# Patient Record
Sex: FEMALE | Race: BLACK OR AFRICAN AMERICAN | NOT HISPANIC OR LATINO | Employment: PART TIME | URBAN - METROPOLITAN AREA
[De-identification: names, ages, dates, MRNs, and addresses within clinical notes are randomized per-mention and may not be internally consistent; named-entity substitution may affect disease eponyms.]

---

## 2017-07-19 ENCOUNTER — TELEPHONE (OUTPATIENT)
Dept: SLEEP MEDICINE | Facility: CLINIC | Age: 47
End: 2017-07-19

## 2017-07-19 NOTE — TELEPHONE ENCOUNTER
----- Message from Grazyna Lua sent at 7/19/2017  9:09 AM CDT -----  Contact: self/ [phone]  Patient needs to have appointment on a Monday or Tuesday due to work schedule.    Please call and advise.  She stated a message could be left if she does not answer.

## 2018-01-24 ENCOUNTER — HOSPITAL ENCOUNTER (EMERGENCY)
Facility: HOSPITAL | Age: 48
Discharge: HOME | End: 2018-01-24
Attending: EMERGENCY MEDICINE
Payer: MEDICARE

## 2018-01-24 VITALS
RESPIRATION RATE: 20 BRPM | HEART RATE: 86 BPM | SYSTOLIC BLOOD PRESSURE: 143 MMHG | HEIGHT: 60 IN | TEMPERATURE: 98 F | BODY MASS INDEX: 33.38 KG/M2 | WEIGHT: 170 LBS | DIASTOLIC BLOOD PRESSURE: 83 MMHG

## 2018-01-24 DIAGNOSIS — B30.9 VIRAL CONJUNCTIVITIS OF LEFT EYE: Primary | ICD-10-CM

## 2018-01-24 PROCEDURE — 99283 EMERGENCY DEPT VISIT LOW MDM: CPT

## 2018-01-24 PROCEDURE — 25000003 PHARM REV CODE 250: Performed by: EMERGENCY MEDICINE

## 2018-01-24 PROCEDURE — 99283 EMERGENCY DEPT VISIT LOW MDM: CPT | Mod: ,,, | Performed by: EMERGENCY MEDICINE

## 2018-01-24 RX ORDER — POLYMYXIN B SULFATE AND TRIMETHOPRIM 1; 10000 MG/ML; [USP'U]/ML
1 SOLUTION OPHTHALMIC EVERY 4 HOURS
Qty: 2.8 ML | Refills: 0 | Status: SHIPPED | OUTPATIENT
Start: 2018-01-24 | End: 2018-01-31

## 2018-01-24 RX ORDER — PROPARACAINE HYDROCHLORIDE 5 MG/ML
1 SOLUTION/ DROPS OPHTHALMIC
Status: COMPLETED | OUTPATIENT
Start: 2018-01-24 | End: 2018-01-24

## 2018-01-24 RX ADMIN — PROPARACAINE HYDROCHLORIDE 1 DROP: 5 SOLUTION/ DROPS OPHTHALMIC at 07:01

## 2018-01-24 RX ADMIN — FLUORESCEIN SODIUM 1 STRIP: 1 STRIP OPHTHALMIC at 07:01

## 2018-01-24 NOTE — ED NOTES
Patient identifiers for Tresa Garcia checked and correct.  LOC: Patient is awake, alert, and aware of environment with an appropriate affect. Patient is oriented x 3 and speaking appropriately.  APPEARANCE: Patient resting comfortably and in no acute distress. Patient is clean and well groomed, patient's clothing is properly fastened.  SKIN: The skin is warm and dry. Patient has normal skin turgor and moist mucus membrances. Skin is intact; no bruising or breakdown noted.  MUSKULOSKELETAL: Patient is moving all extremities well, no obvious deformities noted. Pulses intact.   RESPIRATORY: Airway is open and patent. Respirations are spontaneous and non-labored with normal effort and rate.  CARDIAC: Patient has a normal rate and rhythm. No peripheral edema noted. Capillary refill < 3 seconds.  ABDOMEN: No distention noted. Bowel sounds active in all 4 quadrants. Soft and non-tender upon palpation.  NEUROLOGICAL: PERRL. Facial expression is symmetrical. Hand grasps are equal bilaterally. Normal sensation in all extremities when touched with finger.  Allergies reported:   Review of patient's allergies indicates:   Allergen Reactions    Fish containing products Hives    Shellfish containing products      Hives (skin)^FISH ONLY

## 2018-01-24 NOTE — ED PROVIDER NOTES
Encounter Date: 1/24/2018       History     Chief Complaint   Patient presents with    Eye Problem     patient reports left eye redness and itching. Pt also reports eye drainage     HPI   Ms. Garcia is a 47-year-old AAF with hypothyroidism and HIV (unknown Cd4 count, but on therapy) who presents to the ED for a 3 day history of an injected left eye. She states that it is pruritic and has had a clear discharge from the left eye. She denies pain with ocular movements or vision changes. She denies contact use. She tried Visine without relief.   Denies chest pain, SOB, nausea, vomiting, constipation, or diarrhea.     Review of patient's allergies indicates:   Allergen Reactions    Fish containing products Hives    Shellfish containing products      Hives (skin)^FISH ONLY     Past Medical History:   Diagnosis Date    Asthma     HIV (human immunodeficiency virus infection)     Thyroid disease      Past Surgical History:   Procedure Laterality Date    PARTIAL HYSTERECTOMY       Family History   Problem Relation Age of Onset    Hypertension Mother     Cancer Mother     Diabetes Mother      Social History   Substance Use Topics    Smoking status: Former Smoker     Packs/day: 1.00     Years: 30.00     Types: Cigarettes     Quit date: 6/11/2010    Smokeless tobacco: Never Used    Alcohol use No     Review of Systems   Constitutional: Negative for fever.   HENT: Negative for sore throat.    Eyes: Positive for discharge, redness and itching. Negative for photophobia, pain and visual disturbance.   Respiratory: Negative for shortness of breath.    Cardiovascular: Negative for chest pain.   Gastrointestinal: Negative for nausea.   Genitourinary: Negative for dysuria.   Musculoskeletal: Negative for back pain.   Skin: Negative for rash.   Neurological: Negative for weakness.   Hematological: Does not bruise/bleed easily.   All other systems reviewed and are negative.      Physical Exam     Initial Vitals [01/24/18  0606]   BP Pulse Resp Temp SpO2   (!) 143/83 86 20 98 °F (36.7 °C) --      MAP       103         Physical Exam    Vitals reviewed.  Constitutional: She appears well-developed and well-nourished. She is not diaphoretic. No distress.   HENT:   Head: Normocephalic and atraumatic.   Mouth/Throat: No oropharyngeal exudate.   Eyes: EOM are normal. Pupils are equal, round, and reactive to light. Lids are everted and swept, no foreign bodies found. Right eye exhibits no chemosis, no discharge and no exudate. No foreign body present in the right eye. Left eye exhibits chemosis and discharge (clear). Left eye exhibits no exudate. No foreign body present in the left eye. Right conjunctiva is not injected. Left conjunctiva is injected. Right eye exhibits normal extraocular motion. Left eye exhibits normal extraocular motion.   Fundoscopic exam:       The right eye shows no hemorrhage.   Slit lamp exam:       The right eye shows no corneal abrasion, no corneal ulcer, no foreign body and no fluorescein uptake.        The left eye shows no corneal abrasion, no corneal ulcer, no foreign body and no fluorescein uptake.   Neck: Normal range of motion. Neck supple. No thyromegaly present.   Cardiovascular: Regular rhythm, normal heart sounds and intact distal pulses.   Pulmonary/Chest: Breath sounds normal. No respiratory distress.   Abdominal: Soft. Bowel sounds are normal. She exhibits no distension. There is no tenderness. There is no rebound.   Musculoskeletal: Normal range of motion. She exhibits no edema or tenderness.   Neurological: She is alert and oriented to person, place, and time.   Skin: Skin is warm and dry.   Psychiatric: She has a normal mood and affect.         ED Course   Procedures  Labs Reviewed - No data to display          Medical Decision Making:   Initial Assessment:   Patient has an injected left eye for the past 3 days, without pain. No pain on ocular eye movements. This is likely viral conjunctivitis as it  is uni-ocular and a clear discharge from the eye. There is no pain with ocular movements and no evidence of trauma to suggest orbital abrasion or orbital cellulitis.   ED Management:  Fluorescein examination was preformed without increased uptake suggesting no corneal abrasion. Patient was prescribed polymyxin in order to treat symptoms and allow for a return to work and educated on importance of ophthalmologic appointment if symptoms worsen or do not alleviate.               Attending Attestation:   Physician Attestation Statement for Resident:  As the supervising MD   Physician Attestation Statement: I have personally seen and examined this patient.   I agree with the above history. -:   As the supervising MD I agree with the above PE.   -: 3 days of left eye redness, itching, and clear discharge. No change in vision per pt. No photophobia, no fluorescein uptake. No evidence of preseptal or orbital cellulitis. Overall, consistent with viral conjunctivitis, however, given pt's HIV status will treat with Polytrim drops. Pt extensively advised indications to return and follow up with opthalmology.    As the supervising MD I agree with the above treatment, course, plan, and disposition.                    ED Course      Clinical Impression:   Viral Conjunctivitis of Left Eye     Disposition:   Disposition: Discharged                        Amanda Raymundo MD  Resident  01/24/18 0757       Amanda Raymundo MD  Resident  01/24/18 0757       Virgil Swan MD  01/24/18 0515

## 2018-01-24 NOTE — ED TRIAGE NOTES
Pt states that her left eye has been red and having drainage for the past three days. Pt states that in the morning when she wakes up eye is crusty. Pt states that she has minor pain to left eye.

## 2018-03-29 DIAGNOSIS — M25.552 HIP PAIN, LEFT: Primary | ICD-10-CM

## 2018-09-21 ENCOUNTER — HOSPITAL ENCOUNTER (EMERGENCY)
Facility: HOSPITAL | Age: 48
Discharge: HOME | End: 2018-09-21
Attending: EMERGENCY MEDICINE
Payer: MEDICARE

## 2018-09-21 VITALS
RESPIRATION RATE: 16 BRPM | TEMPERATURE: 98 F | DIASTOLIC BLOOD PRESSURE: 79 MMHG | OXYGEN SATURATION: 97 % | HEART RATE: 84 BPM | WEIGHT: 150 LBS | BODY MASS INDEX: 29.45 KG/M2 | SYSTOLIC BLOOD PRESSURE: 144 MMHG | HEIGHT: 60 IN

## 2018-09-21 DIAGNOSIS — J40 BRONCHITIS: Primary | ICD-10-CM

## 2018-09-21 DIAGNOSIS — R05.9 COUGH: ICD-10-CM

## 2018-09-21 LAB
B-HCG UR QL: NEGATIVE
CTP QC/QA: YES

## 2018-09-21 PROCEDURE — 99283 EMERGENCY DEPT VISIT LOW MDM: CPT | Mod: ,,, | Performed by: EMERGENCY MEDICINE

## 2018-09-21 PROCEDURE — 99284 EMERGENCY DEPT VISIT MOD MDM: CPT | Mod: 25

## 2018-09-21 PROCEDURE — 81025 URINE PREGNANCY TEST: CPT | Performed by: EMERGENCY MEDICINE

## 2018-09-21 RX ORDER — AZITHROMYCIN 250 MG/1
250 TABLET, FILM COATED ORAL DAILY
Qty: 6 TABLET | Refills: 0 | Status: SHIPPED | OUTPATIENT
Start: 2018-09-21 | End: 2018-10-03

## 2018-09-21 RX ORDER — ALBUTEROL SULFATE 90 UG/1
1-2 AEROSOL, METERED RESPIRATORY (INHALATION) EVERY 6 HOURS PRN
Qty: 1 INHALER | Refills: 0 | Status: SHIPPED | OUTPATIENT
Start: 2018-09-21 | End: 2019-09-21

## 2018-09-21 NOTE — ED PROVIDER NOTES
"Encounter Date: 2018    SCRIBE #1 NOTE: I, Son Jeimy, am scribing for, and in the presence of,  Dr. Maher. I have scribed the entire note.       History     Chief Complaint   Patient presents with    Cough     onset cough and chest wall  pain x 2 weeks.  Productive w/ yellow mucus. Denies fever     Time patient was seen by the provider: 8:04 AM      The patient is a 48 y.o. female with co-morbidities including: asthma, HIV, thyroid disease who presents to the ED with a complaint of a persistent cough for two weeks. Pt describes it as a "hard cough" with yellow sputum production. She endorses shortness of breath. She denies fever. Pt has a pmhx of asthma and was prescribed an inhaler; however, she has not been using it.       The history is provided by the patient and medical records.     Review of patient's allergies indicates:   Allergen Reactions    Fish containing products Hives    Shellfish containing products      Hives (skin)^FISH ONLY     Past Medical History:   Diagnosis Date    Asthma     HIV (human immunodeficiency virus infection)     Thyroid disease      Past Surgical History:   Procedure Laterality Date    PARTIAL HYSTERECTOMY       Family History   Problem Relation Age of Onset    Hypertension Mother     Cancer Mother     Diabetes Mother      Social History     Tobacco Use    Smoking status: Former Smoker     Packs/day: 1.00     Years: 30.00     Pack years: 30.00     Types: Cigarettes     Last attempt to quit: 2010     Years since quittin.2    Smokeless tobacco: Never Used   Substance Use Topics    Alcohol use: No    Drug use: No     Review of Systems   Constitutional: Negative for chills and fever.   HENT: Negative for congestion.    Eyes: Negative for visual disturbance.   Respiratory: Positive for cough and shortness of breath.    Cardiovascular: Negative for chest pain.   Gastrointestinal: Negative for abdominal pain.   Genitourinary: Negative for dysuria. "   Musculoskeletal: Negative for back pain.   Skin: Negative for color change.   Neurological: Negative for speech difficulty.       Physical Exam     Initial Vitals [09/21/18 0759]   BP Pulse Resp Temp SpO2   (!) 144/79 84 16 97.8 °F (36.6 °C) 97 %      MAP       --         Physical Exam    Nursing note and vitals reviewed.  Constitutional: She appears well-developed and well-nourished. She is not diaphoretic. No distress.   HENT:   Head: Normocephalic and atraumatic.   Mouth/Throat: Oropharynx is clear and moist.   Eyes: Conjunctivae and EOM are normal. Pupils are equal, round, and reactive to light.   Neck: Normal range of motion. Neck supple. No JVD present.   Cardiovascular: Normal rate, regular rhythm and normal heart sounds.   No murmur heard.  Pulmonary/Chest: No respiratory distress. She has wheezes (scattered in upper lobe). She has rhonchi (scattered in upper lobe). She has no rales.   Abdominal: Soft. Bowel sounds are normal. She exhibits no distension and no mass. There is no tenderness. There is no rebound and no guarding.   Musculoskeletal: Normal range of motion. She exhibits no edema or tenderness.   Neurological: She is alert and oriented to person, place, and time. She has normal strength. No cranial nerve deficit or sensory deficit.   Skin: Skin is warm and dry. No rash noted.   Psychiatric: She has a normal mood and affect.         ED Course   Procedures  Labs Reviewed - No data to display       Imaging Results    None          Medical Decision Making:   History:   Old Medical Records: I decided to obtain old medical records.  Initial Assessment:   Pt with 2 weeks of productive cough. Will get an chest x-ray to r/o pneumonia.   Clinical Tests:   Lab Tests: Ordered and Reviewed  Radiological Study: Reviewed and Ordered  ED Management:  8:38 a.m  Chest X-ray: normal heart, normal lungs  Will discharge home.             Scribe Attestation:   Scribe #1: I performed the above scribed service and the  documentation accurately describes the services I performed. I attest to the accuracy of the note.               Clinical Impression:   The encounter diagnosis was Cough.      Disposition:   Disposition: Discharged  Condition: Stable                        Andres Maher MD  09/21/18 0997

## 2018-09-21 NOTE — ED TRIAGE NOTES
Patient's name and date of birth checked and is correct.    LOC: The patient is awake, alert, and oriented to place, time, situation. Affect is appropriate.  Speech is appropriate and clear.      APPEARANCE: Patient resting comfortably, reporting palpation, light headedness,  in no acute distress.  Patient is clean and well groomed.     SKIN: The skin is warm and dry; color consistent with ethnicity.  Patient has normal skin turgor and moist mucus membranes.  Skin intact; no breakdown or bruising noted.      MUSCULOSKELETAL: Patient moving upper and lower extremities without difficulty.  Denies weakness.      RESPIRATORY: Airway is open and patent. Respirations spontaneous, even, easy, and non-labored.  Patient has a normal effort and rate.  No accessory muscle use noted. Denies cough. Scattered wheezes noted throughout.     CARDIAC:  No peripheral edema noted. No complaints of chest pain.       ABDOMEN: Soft and non tender to palpation.  No distention noted.      NEUROLOGIC: Eyes open spontaneously.  Behavior appropriate to situation.  Follows commands; facial expression symmetrical.  Purposeful motor response noted; normal sensation in all extremities.

## 2018-10-03 ENCOUNTER — HOSPITAL ENCOUNTER (EMERGENCY)
Facility: HOSPITAL | Age: 48
Discharge: HOME | End: 2018-10-03
Attending: EMERGENCY MEDICINE
Payer: MEDICARE

## 2018-10-03 VITALS
HEIGHT: 60 IN | OXYGEN SATURATION: 98 % | RESPIRATION RATE: 18 BRPM | TEMPERATURE: 99 F | DIASTOLIC BLOOD PRESSURE: 64 MMHG | HEART RATE: 82 BPM | BODY MASS INDEX: 29.45 KG/M2 | SYSTOLIC BLOOD PRESSURE: 104 MMHG | WEIGHT: 150 LBS

## 2018-10-03 DIAGNOSIS — R05.9 COUGH: ICD-10-CM

## 2018-10-03 DIAGNOSIS — M79.18 MUSCULOSKELETAL PAIN: Primary | ICD-10-CM

## 2018-10-03 LAB
B-HCG UR QL: NEGATIVE
BILIRUB UR QL STRIP: NEGATIVE
CLARITY UR REFRACT.AUTO: ABNORMAL
COLOR UR AUTO: YELLOW
CTP QC/QA: YES
GLUCOSE UR QL STRIP: NEGATIVE
HGB UR QL STRIP: NEGATIVE
KETONES UR QL STRIP: NEGATIVE
LEUKOCYTE ESTERASE UR QL STRIP: NEGATIVE
NITRITE UR QL STRIP: NEGATIVE
PH UR STRIP: 5 [PH] (ref 5–8)
PROT UR QL STRIP: NEGATIVE
SP GR UR STRIP: 1.02 (ref 1–1.03)
URN SPEC COLLECT METH UR: ABNORMAL
UROBILINOGEN UR STRIP-ACNC: 2 EU/DL

## 2018-10-03 PROCEDURE — 81003 URINALYSIS AUTO W/O SCOPE: CPT

## 2018-10-03 PROCEDURE — 99284 EMERGENCY DEPT VISIT MOD MDM: CPT | Mod: ,,, | Performed by: PHYSICIAN ASSISTANT

## 2018-10-03 PROCEDURE — 99284 EMERGENCY DEPT VISIT MOD MDM: CPT

## 2018-10-03 PROCEDURE — 81025 URINE PREGNANCY TEST: CPT | Performed by: PHYSICIAN ASSISTANT

## 2018-10-03 RX ORDER — BENZONATATE 100 MG/1
200 CAPSULE ORAL 3 TIMES DAILY PRN
Qty: 20 CAPSULE | Refills: 0 | Status: SHIPPED | OUTPATIENT
Start: 2018-10-03 | End: 2018-10-13

## 2018-10-03 RX ORDER — FLUTICASONE PROPIONATE AND SALMETEROL 100; 50 UG/1; UG/1
1 POWDER RESPIRATORY (INHALATION) 2 TIMES DAILY
COMMUNITY

## 2018-10-03 NOTE — ED NOTES
"Patient identifiers verified and correct for Ms Garcia  C/C: Alex flank pain   APPEARANCE: awake and alert in NAD.  SKIN: warm, dry and intact. No breakdown or bruising.  MUSCULOSKELETAL: Patient moving all extremities spontaneously, no obvious swelling or deformities noted. Ambulates independently.  RESPIRATORY: Denies shortness of breath.Respirations unlabored.   CARDIAC: Denies CP, 2+ distal pulses; no peripheral edema  ABDOMEN: Abdomen sift, alex upper flank pain "constant"  : voids spontaneously, pain with urination  Neurologic: AAO x 4; follows commands equal strength in all extremities; denies numbness/tingling. Denies dizziness Denies weakness    "

## 2018-10-03 NOTE — DISCHARGE INSTRUCTIONS
Follow up with family doctor this week. Take medication as prescribed.  Take medications prescribed.

## 2018-10-03 NOTE — ED TRIAGE NOTES
"Patient states jax upper flank pain x 1 week, states urine a "little cloudy" Denies fever, Denies injury. States does not hurt with urination, "constantly hurts" Asleep in lobby upon admit to INTAKE  "

## 2018-10-03 NOTE — ED PROVIDER NOTES
Encounter Date: 10/3/2018       History     Chief Complaint   Patient presents with    Back Pain     bilateral. reports cough x 3 weeks and flank pain x 1 week. hacking cough noted in triage.      Patient is a 48-year-old female with history of HIV on Genvoya, asthma, thyroid disease is presenting to the ER for evaluation of left-sided back pain. Patient states that over the last 1 week she has had increasing pain to the left flank and upper back.  She states she has had a cough for the last 3 weeks which is slightly productive with yellow sputum.  She states that she has been coughing excessively and is not sure if she pulled a muscle.  She has noticed some urinary frequency and cloudy urine.  Denies any hematuria or dysuria otherwise.  No prior history of kidney infections or kidney stones.  No abdominal pain, nausea vomiting otherwise.  No fever chills at home.  She denies chest pain, shortness of breath. No night sweats. She was placed on azithromycin which she finished a full course of.      The history is provided by the patient.     Review of patient's allergies indicates:   Allergen Reactions    Fish containing products Hives    Shellfish containing products      Hives (skin)^FISH ONLY     Past Medical History:   Diagnosis Date    Asthma     Bronchitis     Diabetes mellitus     boarderline    HIV (human immunodeficiency virus infection)     Thyroid disease      Past Surgical History:   Procedure Laterality Date    HYSTERECTOMY      PARTIAL HYSTERECTOMY       Family History   Problem Relation Age of Onset    Hypertension Mother     Cancer Mother     Diabetes Mother      Social History     Tobacco Use    Smoking status: Current Every Day Smoker     Packs/day: 1.50     Years: 30.00     Pack years: 45.00     Types: Cigarettes     Last attempt to quit: 2010     Years since quittin.3    Smokeless tobacco: Never Used   Substance Use Topics    Alcohol use: No    Drug use: No     Review of  Systems   Constitutional: Negative for chills and fever.   HENT: Positive for congestion. Negative for rhinorrhea and sore throat.    Respiratory: Positive for cough. Negative for shortness of breath.    Cardiovascular: Negative for chest pain and palpitations.   Gastrointestinal: Negative for abdominal pain, nausea and vomiting.   Genitourinary: Positive for frequency. Negative for dysuria, flank pain, hematuria and urgency.   Musculoskeletal: Positive for back pain.   Skin: Negative for rash and wound.   Allergic/Immunologic: Positive for immunocompromised state.   Neurological: Negative for dizziness, weakness and light-headedness.   Hematological: Does not bruise/bleed easily.   Psychiatric/Behavioral: Negative for confusion.       Physical Exam     Initial Vitals [10/03/18 1134]   BP Pulse Resp Temp SpO2   120/75 97 18 98.6 °F (37 °C) 98 %      MAP       --         Physical Exam    Constitutional: She appears well-developed and well-nourished. She is not diaphoretic. No distress.   HENT:   Head: Normocephalic and atraumatic.   Mouth/Throat: Oropharynx is clear and moist.   Eyes: Conjunctivae and EOM are normal. Pupils are equal, round, and reactive to light.   Neck: Neck supple.   Cardiovascular: Normal rate, regular rhythm, normal heart sounds and intact distal pulses.   Pulmonary/Chest: Breath sounds normal. No respiratory distress.   Abdominal: Soft. Normal appearance. She exhibits no distension. There is no tenderness. There is no rigidity, no rebound, no guarding and no CVA tenderness.   Neurological: She is alert and oriented to person, place, and time.   Skin: Skin is warm and dry.         ED Course   Procedures  Labs Reviewed   URINALYSIS, REFLEX TO URINE CULTURE - Abnormal; Notable for the following components:       Result Value    Appearance, UA Hazy (*)     All other components within normal limits    Narrative:     Preferred Collection Type->Urine, Clean Catch   POCT URINE PREGNANCY           Imaging Results          X-Ray Chest PA And Lateral (Final result)  Result time 10/03/18 14:17:49    Final result by Tin Light MD (10/03/18 14:17:49)                 Impression:      See above      Electronically signed by: Tin Light MD  Date:    10/03/2018  Time:    14:17             Narrative:    EXAMINATION:  XR CHEST PA AND LATERAL    CLINICAL HISTORY:  Cough    TECHNIQUE:  PA and lateral views of the chest were performed.    COMPARISON:  None 09/21/2018    FINDINGS:  Heart size normal.  The lungs are clear.  No pleural effusion                                       APC / Resident Notes:   Patient seen in the ER promptly upon arrival.  She is afebrile, no acute distress. Physical examination is fairly unremarkable. Lung auscultation clear.  No CVA tenderness noted on exam.  No abdominal tenderness, abdomen soft, nondistended. Urinalysis does not show evidence of infection or blood.  Pregnancy test negative. X-ray of chest was repeated.  No evidence of pneumonia noted. I do feel the patient's symptoms likely secondary to musculoskeletal etiology due to the excessive coughing.  Patient advised to take anti-inflammatory medication if needed.  She will be prescribed home on Tessalon Perles for the cough.  She is otherwise stable for discharge and close follow up with family doctor    The care of this patient was overseen by attending physician who agrees with treatment, plan, and disposition.                   Clinical Impression:   The primary encounter diagnosis was Musculoskeletal pain. A diagnosis of Cough was also pertinent to this visit.      Disposition:   Disposition: Discharged  Condition: Stable                        Carolynn Gannon PA-C  10/03/18 [id number]

## 2018-10-27 ENCOUNTER — HOSPITAL ENCOUNTER (EMERGENCY)
Facility: HOSPITAL | Age: 48
Discharge: HOME | End: 2018-10-27
Attending: EMERGENCY MEDICINE
Payer: MEDICARE

## 2018-10-27 VITALS
WEIGHT: 150 LBS | TEMPERATURE: 98 F | DIASTOLIC BLOOD PRESSURE: 77 MMHG | OXYGEN SATURATION: 98 % | HEART RATE: 88 BPM | RESPIRATION RATE: 18 BRPM | HEIGHT: 60 IN | BODY MASS INDEX: 29.45 KG/M2 | SYSTOLIC BLOOD PRESSURE: 143 MMHG

## 2018-10-27 DIAGNOSIS — B20 HIV DISEASE: ICD-10-CM

## 2018-10-27 DIAGNOSIS — J06.9 URI (UPPER RESPIRATORY INFECTION): ICD-10-CM

## 2018-10-27 DIAGNOSIS — N63.10 LUMP OF RIGHT BREAST: ICD-10-CM

## 2018-10-27 DIAGNOSIS — R05.9 COUGH IN ADULT PATIENT: Primary | ICD-10-CM

## 2018-10-27 PROCEDURE — 99284 EMERGENCY DEPT VISIT MOD MDM: CPT | Mod: ,,, | Performed by: EMERGENCY MEDICINE

## 2018-10-27 PROCEDURE — 93005 ELECTROCARDIOGRAM TRACING: CPT

## 2018-10-27 PROCEDURE — 99284 EMERGENCY DEPT VISIT MOD MDM: CPT | Mod: 25

## 2018-10-27 PROCEDURE — 93010 ELECTROCARDIOGRAM REPORT: CPT | Mod: ,,, | Performed by: INTERNAL MEDICINE

## 2018-10-27 RX ORDER — PREDNISONE 20 MG/1
40 TABLET ORAL DAILY
Qty: 10 TABLET | Refills: 0 | Status: SHIPPED | OUTPATIENT
Start: 2018-10-27 | End: 2018-10-27 | Stop reason: SDUPTHER

## 2018-10-27 RX ORDER — AZITHROMYCIN 250 MG/1
250 TABLET, FILM COATED ORAL DAILY
Qty: 6 TABLET | Refills: 0 | Status: SHIPPED | OUTPATIENT
Start: 2018-10-27 | End: 2023-05-01 | Stop reason: ALTCHOICE

## 2018-10-27 RX ORDER — AZITHROMYCIN 250 MG/1
250 TABLET, FILM COATED ORAL DAILY
Qty: 6 TABLET | Refills: 0 | Status: SHIPPED | OUTPATIENT
Start: 2018-10-27 | End: 2018-10-27 | Stop reason: SDUPTHER

## 2018-10-27 RX ORDER — PREDNISONE 20 MG/1
40 TABLET ORAL DAILY
Qty: 10 TABLET | Refills: 0 | Status: SHIPPED | OUTPATIENT
Start: 2018-10-27 | End: 2018-11-01
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
## 2018-10-27 NOTE — ED PROVIDER NOTES
Encounter Date: 10/27/2018    SCRIBE #1 NOTE: I, Karen Maddox, am scribing for, and in the presence of,  Dr. Flaherty . I have scribed the following portions of the note - the EKG reading. Other sections scribed: HPI,ROS,PE.       History     Chief Complaint   Patient presents with    URI     cough for about 1 month, pain to r rib with coughing, sometime yellow and white     Time patient was seen by the provider: 2:04 PM      The patient is a 48 y.o. female with co-morbidities including: HIV, Asthma, and thyroid disease who presents to the ED with a complaint of persistent cough for the past month. Slightly productive with yellow sputum. Patient was seen here a month ago for similar issue and was given medication without resolution. She states when she coughs, she experience left breast pain. However, it was previously left back pain. Denies trauma or injury. Compliant with medication. Patient smokes a pack a day. Denies fever, diaphoresis, significant weight loss.       The history is provided by the patient.     Review of patient's allergies indicates:   Allergen Reactions    Fish containing products Hives    Shellfish containing products      Hives (skin)^FISH ONLY     Past Medical History:   Diagnosis Date    Asthma     Bronchitis     Diabetes mellitus     boarderline    HIV (human immunodeficiency virus infection)     Thyroid disease      Past Surgical History:   Procedure Laterality Date    HYSTERECTOMY      PARTIAL HYSTERECTOMY       Family History   Problem Relation Age of Onset    Hypertension Mother     Cancer Mother     Diabetes Mother      Social History     Tobacco Use    Smoking status: Current Every Day Smoker     Packs/day: 1.50     Years: 30.00     Pack years: 45.00     Types: Cigarettes     Last attempt to quit: 2010     Years since quittin.3    Smokeless tobacco: Never Used   Substance Use Topics    Alcohol use: No    Drug use: No     Review of Systems   Constitutional:  Negative for fever.   HENT: Negative for sore throat.    Respiratory: Positive for cough. Negative for shortness of breath.    Cardiovascular: Negative for chest pain.   Gastrointestinal: Negative for nausea.   Genitourinary: Negative for dysuria.   Musculoskeletal: Negative for back pain.   Skin: Negative for rash.   Neurological: Negative for weakness.   Hematological: Does not bruise/bleed easily.       Physical Exam     Initial Vitals [10/27/18 1330]   BP Pulse Resp Temp SpO2   (!) 143/77 88 18 97.5 °F (36.4 °C) 98 %      MAP       --         Physical Exam    Nursing note and vitals reviewed.  Constitutional: She appears well-developed and well-nourished. She is not diaphoretic. No distress.   HENT:   Head: Normocephalic and atraumatic.   Mouth/Throat: Oropharynx is clear and moist.   Eyes: Conjunctivae and EOM are normal. Pupils are equal, round, and reactive to light.   Neck: Normal range of motion. Neck supple. No JVD present.   Cardiovascular: Normal rate, regular rhythm and normal heart sounds.   No murmur heard.  Pulmonary/Chest: She has no wheezes. She has no rhonchi. She has no rales. Right breast exhibits tenderness.   Diminished breath sounds    Breast exam:   Bilateral symmetrical breast. Normal appearing nipples and areola.   Right: palpable subcutaneous lump at approximately 9 o'clock. Tender to palpation. No changes. No fluctuant. No point. No lymphadenopathy   Left breast: unremarkable.    Abdominal: Soft. She exhibits no distension and no mass. There is no tenderness. There is no rebound and no guarding.   Musculoskeletal: Normal range of motion. She exhibits no edema or tenderness.   Neurological: She is alert and oriented to person, place, and time. She has normal strength.   Skin: Skin is warm and dry. No rash noted.   Psychiatric: Her behavior is normal. Thought content normal.         ED Course   Procedures  Labs Reviewed - No data to display  EKG Readings: (Independently Interpreted)    Initial Reading: No STEMI. Rhythm: Normal Sinus Rhythm.   Ventricular rate: 90 bpm  Possible left atrial enlargement   Nonspecific ST abnormality   Abnormal ECG       Imaging Results    None          Medical Decision Making:   History:   Old Medical Records: I decided to obtain old medical records.  Independently Interpreted Test(s):   I have ordered and independently interpreted EKG Reading(s) - see prior notes  Clinical Tests:   Medical Tests: Ordered and Reviewed            Scribe Attestation:   Scribe #1: I performed the above scribed service and the documentation accurately describes the services I performed. I attest to the accuracy of the note.     Medical decision making:  HIV positive patient with 3rd visit for persistent nonproductive cough previous chest x-rays have been unremarkable as is lab work patient has been treated primarily symptomatic leave with antitussive agents at this point I am prescribing azithromycin pack and 5 day course of prednisone.  I have also referred her and strongly encouraged her to have a mammogram for a painful right breast lump I sent a note to her [facility] physician who sees her for Infectious Disease and asked that she follow up on this and also refer the patient back to her primary doctor.  Patient is agreement with the plan I did not require a bronchodilator which she had a previous prescription for.           Clinical Impression:   The primary encounter diagnosis was Cough in adult patient. Diagnoses of URI (upper respiratory infection), Lump of right breast, and HIV disease were also pertinent to this visit.      Disposition:   Disposition: Discharged  Condition: Stable           I, Dr. Leon Flaherty, personally performed the services described in this documentation.   All medical record entries made by the scribe were at my direction and in my presence.   I have reviewed the chart and agree that the record is accurate and complete.   Leon Flaherty MD.  6:55 AM  10/28/2018            Leon Flaherty MD  10/28/18 0656

## 2018-10-27 NOTE — ED NOTES
LOC: The patient is awake and alert; oriented x 3 and speaking appropriately.  APPEARANCE: Patient resting comfortably, patient is clean and well groomed  SKIN: warm and dry, normal skin turgor & moist mucus membranes, skin intact, no breakdown noted.  MUSCULOSKELETAL: Patient moving all extremities well, no obvious swelling or deformities noted  RESPIRATORY: Airway is open and patent,  respirations are spontaneous, normal effort and rate, c/o coughing causing rib pain .   CARDIAC: Patient has a normal rate, no peripheral edema noted, capillary refill < 3 seconds; No complaints of chest pain   ABDOMEN: Soft and non tender to palpation, no distention noted. Bowel sounds present x 4

## 2018-10-30 DIAGNOSIS — N64.59 ABNORMAL BREAST EXAM: Primary | ICD-10-CM

## 2018-11-06 ENCOUNTER — HOSPITAL ENCOUNTER (OUTPATIENT)
Dept: RADIOLOGY | Facility: HOSPITAL | Age: 48
Discharge: HOME | End: 2018-11-06
Payer: MEDICARE

## 2018-11-06 DIAGNOSIS — N64.59 ABNORMAL BREAST EXAM: ICD-10-CM

## 2018-11-06 PROCEDURE — 77062 BREAST TOMOSYNTHESIS BI: CPT | Mod: TC,PO

## 2018-11-06 PROCEDURE — 77066 DX MAMMO INCL CAD BI: CPT | Mod: 26,,, | Performed by: RADIOLOGY

## 2018-11-06 PROCEDURE — 77062 BREAST TOMOSYNTHESIS BI: CPT | Mod: 26,,, | Performed by: RADIOLOGY

## 2018-11-12 ENCOUNTER — HOSPITAL ENCOUNTER (EMERGENCY)
Facility: HOSPITAL | Age: 48
Discharge: HOME | End: 2018-11-12
Attending: EMERGENCY MEDICINE
Payer: MEDICARE

## 2018-11-12 VITALS
DIASTOLIC BLOOD PRESSURE: 88 MMHG | RESPIRATION RATE: 18 BRPM | WEIGHT: 150 LBS | SYSTOLIC BLOOD PRESSURE: 136 MMHG | HEIGHT: 60 IN | TEMPERATURE: 99 F | HEART RATE: 86 BPM | OXYGEN SATURATION: 99 % | BODY MASS INDEX: 29.45 KG/M2

## 2018-11-12 DIAGNOSIS — R07.89 MUSCULOSKELETAL CHEST PAIN: Primary | ICD-10-CM

## 2018-11-12 PROCEDURE — 99284 EMERGENCY DEPT VISIT MOD MDM: CPT | Mod: 25

## 2018-11-12 PROCEDURE — 27100098 HC SPACER

## 2018-11-12 PROCEDURE — 99283 EMERGENCY DEPT VISIT LOW MDM: CPT | Mod: ,,, | Performed by: EMERGENCY MEDICINE

## 2018-11-12 RX ORDER — NAPROXEN 500 MG/1
500 TABLET ORAL 2 TIMES DAILY WITH MEALS
Qty: 30 TABLET | Refills: 0 | Status: SHIPPED | OUTPATIENT
Start: 2018-11-12 | End: 2022-11-14 | Stop reason: ALTCHOICE

## 2018-11-12 RX ORDER — TIZANIDINE 2 MG/1
TABLET ORAL
Qty: 15 TABLET | Refills: 0 | Status: SHIPPED | OUTPATIENT
Start: 2018-11-12

## 2018-11-12 NOTE — DISCHARGE INSTRUCTIONS
Our goal in the emergency department is to always give you outstanding care and exceptional service. You may receive a survey by mail or e-mail in the next week regarding your experience in our ED. We would greatly appreciate your completing and returning the survey. Your feedback provides us with a way to recognize our staff who give very good care and it helps us learn how to improve when your experience was below our aspiration of excellence.     Talk with your primary care physician about quitting smoking.  You may use heat to help alleviate your chest wall pain. Take the naproxen as directed and the muscle relaxer, Zanaflex, as needed for muscle pain and spasm.  You should be using your Advair inhaler 1 puff, twice a day.    Return to the emergency department for worsening in any way including worsening shortness of breath, change in your pain, or any other problems.

## 2018-11-12 NOTE — ED NOTES
Aero chamber provided and instructions given verbally and with demonstration for its use.  Patient able to return demo correctly.

## 2018-11-12 NOTE — ED NOTES
Pt identifiers checked and accurate with Tresa Garcia     Pt reports to ED with complaints of right breast pain since September. Pt reports having mammogram last week with normal results. Pt reports laying on right sight mildly relieves pain, lying on left side increases pain. Pt denies trauma, falls, chest pain, SOB, N/V/D, fever, chills.    LOC: The patient is awake, alert and aware of environment with an appropriate affect, the patient is oriented x 3 and speaking appropriately.  APPEARANCE: Patient resting comfortably and in no acute distress, patient is clean and well groomed  SKIN: The skin is warm and dry, color consistent with ethnicity, skin intact, no breakdown or bruising noted.  MUSCULOSKELETAL: Patient moving all extremities well, no obvious swelling or deformities noted. Pt ambulates unassisted with steady gait.   RESPIRATORY: Airway is open and patent, breath sounds clear throughout all lung fields; respirations are spontaneous, patient has a normal effort and rate, no accessory muscle use noted. Pt reports cough with brown sputum since September.   CARDIAC: Patient has no peripheral edema noted, capillary refill < 3 seconds. No complaints of chest pain at this time.   NEUROLOGIC: PERRL, 3 mm bilaterally, eyes open spontaneously, behavior appropriate to situation, follows commands.

## 2018-11-12 NOTE — ED PROVIDER NOTES
Encounter Date: 11/12/2018    SCRIBE #1 NOTE: I, Karen Maddox, am scribing for, and in the presence of,  Dr. Davenport . I have scribed the entire note.       History     Chief Complaint   Patient presents with    Breast Pain     r breast pain     Time patient was seen by the provider: 8:52 AM      The patient is a 48 y.o. female with co-morbidities including: asthma, DM, HIV, and Thyroid disease  who presents to the ED with a complaint of right chest wall pain. Patient states her pain is underneath her breast that radiates to her back. Pain is constant and worsens with movement, taking in deep breaths, and when she coughs. Pain worsen last night. She states her cough from previous ED visit has gotten better. Patient had a mammogram last week stating results were negative. She has an appointment with Infectious disease November 26 to follow up with her symptoms. Patient is not using her Advair as prescribed. Endorses ROSADO. Denies fever, rashes, sore throat, or rhinorrhea, N/V/D, or chills. Denies past ulcers or kidney issues. Has not taken any medication for relief. Patient is a smoker      The history is provided by the patient.     Review of patient's allergies indicates:   Allergen Reactions    Fish containing products Hives    Shellfish containing products      Hives (skin)^FISH ONLY     Past Medical History:   Diagnosis Date    Asthma     Bronchitis     Diabetes mellitus     boarderline    HIV (human immunodeficiency virus infection)     Thyroid disease      Past Surgical History:   Procedure Laterality Date    HYSTERECTOMY      OOPHORECTOMY      PARTIAL HYSTERECTOMY       Family History   Problem Relation Age of Onset    Hypertension Mother     Cancer Mother     Diabetes Mother      Social History     Tobacco Use    Smoking status: Current Every Day Smoker     Packs/day: 1.50     Years: 30.00     Pack years: 45.00     Types: Cigarettes     Last attempt to quit: 6/11/2010     Years since  quittin.4    Smokeless tobacco: Never Used   Substance Use Topics    Alcohol use: No    Drug use: No     Review of Systems   Constitutional: Negative for chills and fever.   HENT: Negative for rhinorrhea and sore throat.    Respiratory: Positive for cough and shortness of breath.    Gastrointestinal: Negative for diarrhea, nausea and vomiting.       Physical Exam     Initial Vitals [18 0843]   BP Pulse Resp Temp SpO2   136/88 86 18 98.7 °F (37.1 °C) 99 %      MAP       --         Physical Exam    HENT:   Head: Normocephalic and atraumatic.   Mouth/Throat: Oropharynx is clear and moist.   Cardiovascular: Normal rate, regular rhythm and normal heart sounds. Exam reveals no gallop and no friction rub.    No murmur heard.  Pulmonary/Chest: Breath sounds normal. She has no wheezes. She has no rhonchi. She has no rales. She exhibits tenderness.   Patient has TTP over her right chest wall at the mid thoracic region, extending from inferior to the right breast to inferior to the scapula. Palpation in this region reproduced chest pain as does certain movement of right arm   Abdominal: Soft. She exhibits no distension. There is no tenderness. There is no rebound and no guarding.   Skin: Skin is warm and dry. No rash noted.         ED Course   Procedures  Labs Reviewed - No data to display            Medical Decision Making:   History:   Old Medical Records: I decided to obtain old medical records.  Old Records Summarized: records from clinic visits.       <> Summary of Records: ED visit here on  for cough for a month that was associated with left breast pain. On breast exam, the ED phy ound a tender lump at 9 oclock on left breast. cough px steoirds and zpak and recom she follow up for breast lump. order in computer for mamogram.   Initial Assessment:   48 y.o. female with right chest wall pain. Although nurse's note reports breast pain, patient reports it is under her right breast extending to her  right mid back. Patient has a cough for 6 weeks that has improved but persists. She continues to smokes and does not use inhaler correctly. Lungs are clear on my exam, no need for chest Xray. Will give Naproxen and Robaxin. Will give a spacer and instructions on use. She has follow up with PCP within two weeks.     Additional MDM:   Smoking Cessation: The patient is a smoker. The patient was counseled on smoking cessation for: 5 minutes. The patient was counseled on tobacco related  health complications.          Scribe Attestation:   Scribe #1: I performed the above scribed service and the documentation accurately describes the services I performed. I attest to the accuracy of the note.    Attending Attestation:           Physician Attestation for Scribe:      Comments: I, Dr. Audra Davenport, personally performed the services described in this documentation. All medical record entries made by the scribe were at my direction and in my presence.  I have reviewed the chart and agree that the record reflects my personal performance and is accurate and complete. Audra Davenport MD.  5:46 PM 11/12/2018                 Clinical Impression:   The encounter diagnosis was Musculoskeletal chest pain.      Disposition:   Disposition: Discharged  Condition: Stable                        Audar Davenport MD  11/12/18 0059

## 2019-01-08 DIAGNOSIS — J45.909 ASTHMA, UNSPECIFIED ASTHMA SEVERITY, UNSPECIFIED WHETHER COMPLICATED, UNSPECIFIED WHETHER PERSISTENT: Primary | ICD-10-CM

## 2021-11-17 ENCOUNTER — TELEPHONE (OUTPATIENT)
Dept: NEUROLOGY | Facility: CLINIC | Age: 51
End: 2021-11-17
Payer: MEDICARE

## 2022-01-18 ENCOUNTER — TELEPHONE (OUTPATIENT)
Dept: NEUROLOGY | Facility: CLINIC | Age: 52
End: 2022-01-18
Payer: MEDICARE

## 2022-01-19 ENCOUNTER — OFFICE VISIT (OUTPATIENT)
Dept: NEUROLOGY | Facility: CLINIC | Age: 52
End: 2022-01-19
Payer: MEDICARE

## 2022-01-19 VITALS — DIASTOLIC BLOOD PRESSURE: 76 MMHG | HEART RATE: 83 BPM | SYSTOLIC BLOOD PRESSURE: 131 MMHG

## 2022-01-19 DIAGNOSIS — G89.29 CHRONIC MIDLINE LOW BACK PAIN WITH LEFT-SIDED SCIATICA: Primary | ICD-10-CM

## 2022-01-19 DIAGNOSIS — M54.42 CHRONIC MIDLINE LOW BACK PAIN WITH LEFT-SIDED SCIATICA: Primary | ICD-10-CM

## 2022-01-19 PROCEDURE — 99499 UNLISTED E&M SERVICE: CPT | Mod: S$GLB,,, | Performed by: STUDENT IN AN ORGANIZED HEALTH CARE EDUCATION/TRAINING PROGRAM

## 2022-01-19 PROCEDURE — 99999 PR PBB SHADOW E&M-EST. PATIENT-LVL III: ICD-10-PCS | Mod: PBBFAC,,, | Performed by: STUDENT IN AN ORGANIZED HEALTH CARE EDUCATION/TRAINING PROGRAM

## 2022-01-19 PROCEDURE — 3078F PR MOST RECENT DIASTOLIC BLOOD PRESSURE < 80 MM HG: ICD-10-PCS | Mod: CPTII,S$GLB,, | Performed by: STUDENT IN AN ORGANIZED HEALTH CARE EDUCATION/TRAINING PROGRAM

## 2022-01-19 PROCEDURE — 1159F PR MEDICATION LIST DOCUMENTED IN MEDICAL RECORD: ICD-10-PCS | Mod: CPTII,S$GLB,, | Performed by: STUDENT IN AN ORGANIZED HEALTH CARE EDUCATION/TRAINING PROGRAM

## 2022-01-19 PROCEDURE — 3078F DIAST BP <80 MM HG: CPT | Mod: CPTII,S$GLB,, | Performed by: STUDENT IN AN ORGANIZED HEALTH CARE EDUCATION/TRAINING PROGRAM

## 2022-01-19 PROCEDURE — 99499 NO LOS: ICD-10-PCS | Mod: S$GLB,,, | Performed by: STUDENT IN AN ORGANIZED HEALTH CARE EDUCATION/TRAINING PROGRAM

## 2022-01-19 PROCEDURE — 1159F MED LIST DOCD IN RCRD: CPT | Mod: CPTII,S$GLB,, | Performed by: STUDENT IN AN ORGANIZED HEALTH CARE EDUCATION/TRAINING PROGRAM

## 2022-01-19 PROCEDURE — 3075F SYST BP GE 130 - 139MM HG: CPT | Mod: CPTII,S$GLB,, | Performed by: STUDENT IN AN ORGANIZED HEALTH CARE EDUCATION/TRAINING PROGRAM

## 2022-01-19 PROCEDURE — 99999 PR PBB SHADOW E&M-EST. PATIENT-LVL III: CPT | Mod: PBBFAC,,, | Performed by: STUDENT IN AN ORGANIZED HEALTH CARE EDUCATION/TRAINING PROGRAM

## 2022-01-19 PROCEDURE — 3075F PR MOST RECENT SYSTOLIC BLOOD PRESS GE 130-139MM HG: ICD-10-PCS | Mod: CPTII,S$GLB,, | Performed by: STUDENT IN AN ORGANIZED HEALTH CARE EDUCATION/TRAINING PROGRAM

## 2022-01-19 RX ORDER — GABAPENTIN 300 MG/1
600 CAPSULE ORAL NIGHTLY
Qty: 360 CAPSULE | Refills: 1 | Status: SHIPPED | OUTPATIENT
Start: 2022-01-19

## 2022-01-19 RX ORDER — HYDROGEN PEROXIDE 3 %
20 SOLUTION, NON-ORAL MISCELLANEOUS
COMMUNITY

## 2022-11-14 ENCOUNTER — HOSPITAL ENCOUNTER (OUTPATIENT)
Dept: RADIOLOGY | Facility: HOSPITAL | Age: 52
Discharge: HOME | End: 2022-11-14
Attending: PODIATRIST
Payer: MEDICARE

## 2022-11-14 ENCOUNTER — OFFICE VISIT (OUTPATIENT)
Dept: PODIATRY | Facility: CLINIC | Age: 52
End: 2022-11-14
Payer: MEDICARE

## 2022-11-14 VITALS
SYSTOLIC BLOOD PRESSURE: 133 MMHG | WEIGHT: 160.94 LBS | HEIGHT: 60 IN | BODY MASS INDEX: 31.6 KG/M2 | DIASTOLIC BLOOD PRESSURE: 91 MMHG | HEART RATE: 79 BPM

## 2022-11-14 DIAGNOSIS — M79.671 FOOT PAIN, BILATERAL: Primary | ICD-10-CM

## 2022-11-14 DIAGNOSIS — M79.672 FOOT PAIN, BILATERAL: Primary | ICD-10-CM

## 2022-11-14 DIAGNOSIS — M76.62 ACHILLES TENDINITIS OF BOTH LOWER EXTREMITIES: ICD-10-CM

## 2022-11-14 DIAGNOSIS — M79.672 FOOT PAIN, BILATERAL: ICD-10-CM

## 2022-11-14 DIAGNOSIS — M76.61 ACHILLES TENDINITIS OF BOTH LOWER EXTREMITIES: ICD-10-CM

## 2022-11-14 DIAGNOSIS — M79.671 FOOT PAIN, BILATERAL: ICD-10-CM

## 2022-11-14 DIAGNOSIS — M19.079 ARTHROSIS OF MIDFOOT, UNSPECIFIED LATERALITY: ICD-10-CM

## 2022-11-14 DIAGNOSIS — M72.2 PLANTAR FASCIITIS: ICD-10-CM

## 2022-11-14 PROCEDURE — 3080F DIAST BP >= 90 MM HG: CPT | Mod: CPTII,S$GLB,, | Performed by: PODIATRIST

## 2022-11-14 PROCEDURE — 3080F PR MOST RECENT DIASTOLIC BLOOD PRESSURE >= 90 MM HG: ICD-10-PCS | Mod: CPTII,S$GLB,, | Performed by: PODIATRIST

## 2022-11-14 PROCEDURE — 3075F SYST BP GE 130 - 139MM HG: CPT | Mod: CPTII,S$GLB,, | Performed by: PODIATRIST

## 2022-11-14 PROCEDURE — 3075F PR MOST RECENT SYSTOLIC BLOOD PRESS GE 130-139MM HG: ICD-10-PCS | Mod: CPTII,S$GLB,, | Performed by: PODIATRIST

## 2022-11-14 PROCEDURE — 1159F MED LIST DOCD IN RCRD: CPT | Mod: CPTII,S$GLB,, | Performed by: PODIATRIST

## 2022-11-14 PROCEDURE — 99999 PR PBB SHADOW E&M-EST. PATIENT-LVL IV: CPT | Mod: PBBFAC,,, | Performed by: PODIATRIST

## 2022-11-14 PROCEDURE — 73630 XR FOOT COMPLETE 3 VIEW BILATERAL: ICD-10-PCS | Mod: 26,50,, | Performed by: RADIOLOGY

## 2022-11-14 PROCEDURE — 3008F PR BODY MASS INDEX (BMI) DOCUMENTED: ICD-10-PCS | Mod: CPTII,S$GLB,, | Performed by: PODIATRIST

## 2022-11-14 PROCEDURE — 73630 X-RAY EXAM OF FOOT: CPT | Mod: 26,50,, | Performed by: RADIOLOGY

## 2022-11-14 PROCEDURE — 1160F PR REVIEW ALL MEDS BY PRESCRIBER/CLIN PHARMACIST DOCUMENTED: ICD-10-PCS | Mod: CPTII,S$GLB,, | Performed by: PODIATRIST

## 2022-11-14 PROCEDURE — 99203 PR OFFICE/OUTPT VISIT, NEW, LEVL III, 30-44 MIN: ICD-10-PCS | Mod: S$GLB,,, | Performed by: PODIATRIST

## 2022-11-14 PROCEDURE — 1160F RVW MEDS BY RX/DR IN RCRD: CPT | Mod: CPTII,S$GLB,, | Performed by: PODIATRIST

## 2022-11-14 PROCEDURE — 3008F BODY MASS INDEX DOCD: CPT | Mod: CPTII,S$GLB,, | Performed by: PODIATRIST

## 2022-11-14 PROCEDURE — 99203 OFFICE O/P NEW LOW 30 MIN: CPT | Mod: S$GLB,,, | Performed by: PODIATRIST

## 2022-11-14 PROCEDURE — 99999 PR PBB SHADOW E&M-EST. PATIENT-LVL IV: ICD-10-PCS | Mod: PBBFAC,,, | Performed by: PODIATRIST

## 2022-11-14 PROCEDURE — 73630 X-RAY EXAM OF FOOT: CPT | Mod: TC,50

## 2022-11-14 PROCEDURE — 1159F PR MEDICATION LIST DOCUMENTED IN MEDICAL RECORD: ICD-10-PCS | Mod: CPTII,S$GLB,, | Performed by: PODIATRIST

## 2022-11-14 RX ORDER — MELOXICAM 15 MG/1
15 TABLET ORAL DAILY
Qty: 30 TABLET | Refills: 1 | Status: SHIPPED | OUTPATIENT
Start: 2022-11-14 | End: 2023-11-14

## 2022-11-14 NOTE — PATIENT INSTRUCTIONS
Supplements for inflammation: Arnica Tabs, bromelain with tumeric, alpha lipoic acid, garlic     Over the counter pain creams: Voltaren Gel, Biofreeze, Bengay, tiger balm, two old goat, lidocaine gel,  Absorbine Veterinary Liniment Gel Topical Analgesic Sore Muscle and Joint Pain Relief    Recommend lotions: eucerin, eucerin for diabetics, aquaphor, A&D ointment, gold bond for diabetics, sween, Orange's Bees all purpose baby ointment,  urea 40 with aloe (found on amazon.com)    Shoe recommendations: (try 6pm.com, zappos.Eventtus , nordstromrack.Eventtus, or shoes.Eventtus for discounted prices) you can visit [employer] in [place]  or [facility] in the [facility] (there are also several shoe brand outlets in the [facility])    Asics (GT 2000 or gel foundations), new balance stability type shoes (such as the 940 series), niall (stabil c3),  Alcala (GTS or Beast or transcend), propet, Manjit (tennis shoe)    Sofft Brand, baretraps (women) Pittman&Kadeem (men), clarks, crocs, aerosoles, naturalizers, SAS, ecco, born, isidro connor, rockports (dress shoes)    Vionic, burkenstocks, fitflops, propet, baretraps (sandals)    HokaOne sandals, rubber birkenstock sandals crocs, propet (house shoes)    Nail Home remedy:  Vicks Vapor rub or Emuaid to nails for easier manageability    Occasional soaks for 15-20 mins in luke warm water with 1/2 cup of listerine and 1 cup of apple cider vinegar are ok You may add several drops of oil of oregano or tea tree oil as well      Understanding Achilles Tendonitis    Achilles tendonitis is an overuse injury. It results in inflammation of the Achilles tendon. This tendon is found on the back of the ankle. It links the calf muscle to the heel bone. It helps you do pushing-off movements like running or standing on your toes.     How to say it  uh-KILL-eez ten-dun-I-tis   What causes Achilles tendonitis?  Achilles tendonitis can happen if you do an activity like running, walking, or jumping too much. This  overuse can strain, or pull, the tendon. It may lead to minor tearing of the tendon. An injury to the lower leg or foot can also cause it.  If you dont warm up before taking part in sports such as basketball, you are more likely to suffer from this condition. You are also more prone to it if you do too much of such an activity too quickly. Proper training and rest can help prevent it.  Symptoms of Achilles tendonitis  The main symptom of Achilles tendonitis is pain. This pain mostly happens when you move the ankle. The tendon may also feel stiff after a period of no activity, such as sleeping. It may also become swollen. You may hear a crackling sound when you move your ankle.  Treatment for Achilles tendonitis  Symptoms often get better after starting treatment. A full recovery may take several months. Treatments include:  Rest. You should stop or change the activity that caused the injury. The tendon will then have time to heal.  Cold or heat pack. These help reduce pain and swelling.  Prescription or over-the-counter pain medicines. These help reduce pain and swelling.  Shoe inserts. These devices can reduce strain on the Achilles tendon when you move. You may then feel less pain.  Stretching and strengthening exercises. Certain exercises can help you regain flexibility and strength in your Achilles tendon.  Surgery. This option can fix the injured tendon. But you dont often need it unless other treatments dont work.     When to call your healthcare provider   Call your healthcare provider right away if you have any of these:  Fever of 100.4°F (38°C) or higher, or as directed  Pain that gets worse  Symptoms that dont get better, or get worse  New symptoms    Date Last Reviewed: 3/10/2016  © 5848-7730 [employer]. [address] [place] [address], [place] [address]. All rights reserved. This information is not intended as a substitute for professional medical care. Always follow your healthcare  professional's instructions.        Achilles Tendon Rupture, Partial or Complete    The Achilles tendon connects the muscles in the back of your lower leg to your heel bone. It lets you move your foot down--called a step on the gas motion. This movement is important for walking, running, and jumping. A sudden strong contraction of the lower leg can partially tear (rupture) the Achilles tendon. This injury can happen while playing sports. This injury is more likely if you have injured the tendon in the past. It is also more likely if the tendon is inflamed from stress.   When the injury happens, you may feel a pop or snap, or like you have been kicked. An Achilles tendon tear will cause swelling, bruising, and pain in the ankle area. You will have difficulty walking or pushing off with your foot.   A complete Achilles tear is usually treated with surgery to attach the torn ends of the tendon. This is followed by up to 8 weeks in a walking cast, boot, or splint. The injury can also be treated without surgery, but the tendon will take longer to heal. The risk of rupturing it again is also greater than with surgery. With either type of treatment, you will need physical therapy to strengthen your Achilles tendon. It usually takes up to 6 months to return to your former level of activity and about a year to fully recover.  Home care  Medicine  The doctor may prescribe medicines for pain. Or you may use acetaminophen or Ibuprofen to control the pain.  Talk with your doctor before taking these medicines if you have chronic liver or kidney disease. Also talk with your doctor if you have had an ulcer or GI bleeding.  General care  Rest. Use crutches as directed. Do not put any weight on the injured leg until your doctor says it is OK.  You will be told to see an orthopedic doctor as soon as possible. This is a doctor with special training to treat foot and ankle problems.  Use ice. Put a cold pack on the injured area for 20  minutes at a time. Do this at least 4 to 8 times a day for the first 48 hours. After the first 48 hours, use the cold pack to ease pain and swelling, as needed. You can make your own cold pack from a sealed bag of frozen peas or ice. Wrap the bag in a towel. Dont put the cold source directly on your skin. (If you are using ice, be careful to avoid getting the cast wet as the ice melts.) If you have a splint that can't be removed, put the cold pack over the splint.  Use compression. The doctor may give you an elastic wrap, boot, air cast or splint to help ease swelling. Use this as the doctor tells you to.  Elevate your leg. During the first 48 hours, keep your injured leg elevated on a pillow when you are sitting or lying down. The pillow should be at a level above your heart. This is very important to reduce swelling.  Keep the splint or cast dry. When bathing, protect it with a large plastic bag. Make sure to tape the plastic bag closed. If a fiberglass splint or cast gets wet, you can dry it with a hair dryer.  Follow-up care  You will be referred to an orthopedic doctor for follow-up care. Surgery may be needed to repair this injury, so it is very important to make an appointment with the specialist as soon as you can.  When to seek medical advice  Call your healthcare provider right away if any of these occur:  A plaster splint or cast gets wet or soft or breaks  A fiberglass splint or cast gets wet and does not dry in 24 hours  Pain or swelling gets worse, or redness appears  Toes or the injured area become cold, blue, numb or tingly  You cannot put weight on the injured leg  Date Last Reviewed: 9/29/2015  © 4931-0088 [employer]. [address] [place] [address], [place] [address]. All rights reserved. This information is not intended as a substitute for professional medical care. Always follow your healthcare professional's instructions.        Calf Raise (Strength)    Stand up straight with both feet  flat on the floor, slightly apart. Hold onto a sturdy chair, railing, counter, or table.  Raise both heels so youre standing on the balls of your feet. Dont lock your knees or arch your back. Hold for 5 seconds. Then slowly lower your heels back down to the floor.  Repeat 10 times, or as instructed.  Do this exercise 3 times a day, or as instructed.     Challenge yourself  As you become stronger, do this exercise on one foot at a time.   Date Last Reviewed: 3/10/2016  © 7639-9108 [employer]. [address] [place] [address]. All rights reserved. This information is not intended as a substitute for professional medical care. Always follow your healthcare professional's instructions.        Plantarflexion (Strength)    These instructions are for your right ankle. Switch sides for your left ankle.  Sit on a bed or the floor with your right leg out straight.  Loop an elastic exercise band or tubing around your right foot. Hold the ends in your hands.  Push on the elastic band with the ball of your foot, pointing your toes. Pull the elastic band toward as you do this. Hold for 5 seconds. Then move your foot back to the starting position.  Repeat 10 times, or as instructed.  Do this exercise 3 times a day, or as instructed.  Date Last Reviewed: 3/10/2016  © 8103-7726 [employer]. [address] [place] [address]. All rights reserved. This information is not intended as a substitute for professional medical care. Always follow your healthcare professional's instructions.        Wearing Proper Shoes                    You walk on your feet every day, forcing them to support the weight of your body. Repeated stress on your feet can cause damage over time. The right shoes can help protect your feet. The wrong shoes can cause more foot problems. Read the information below to help you find a shoe that fits your foot needs.     A good shoe fit will cover your foot outline.       A  shoe that doesnt cover the outline is a bad fit.      Whats your foot shape?  To get a good fit, you need to know the shape of your foot. Do this simple test: While standing, place your foot on a piece of paper and trace around it. Is your foot straight or curved? Do you have a foot problem, such as a bunion, that causes your foot outline to show a bulge on the side of your big toe?  Finding your fit  Bring your foot outline to the shoe store to help you find the right shoe. Place a shoe you like on top of the outline to see if it matches the shape. The shoe should cover the outline. (If you have a bunion, the shoe may not cover the bulge on the outline. Look for soft leather shoes to stretch over the bunion.) Once youve found a pair of proper shoes, put them on. Walk around. Be sure the shoes dont rub or pinch. If the shoes feel good, youve found your fit!  The right shoe for you  A good shoe has features that provide comfort and support. It must also be the right size and shape for your feet. Look for a shoe made of breathable fabric and lining, such as leather or canvas. Be sure that shoes have enough tread to prevent slipping. Go to a good shoe store for help finding the right shoe.  Good shoe features  An ideal shoe has the following:  Laces for support. If tying laces is a problem for you, try shoes with Velcro fasteners or matt.  A front of the shoe (toe box) with ½ inch space in front of your longest toes.  An arch shape that supports your foot.  No more than 1½ inches of heel.  A stiff, snug back of the shoe to keep your foot from sliding around.  A smooth lining with no rough seams.  Shoe shopping tips  Below are some dos and donts for when you go to the shoe store.  Do:  Select the shoes that feel right. Wear them around the house. Then bring them to your foot healthcare provider to check for fit. If they dont fit well, return them.  Shop late in the day, when your feet will be slightly  bigger.  Each time you buy shoes, have both your feet measured while you are standing. Foot size changes with time.  Pick shoes to suit their purpose. High heels are OK for an occasional night on the town. But for everyday wear, choose a more sensible shoe.  Try on shoes while wearing any inserts specially made for your feet (orthoses).  Try on both the right and left shoes. If your feet are different sizes, pick a pair that fits the larger foot.  Dont:  Dont buy shoes based on shoe size alone. Always try on shoes, as sizes differ from brand to brand and within brands.  Dont expect shoes to break in. If they dont fit at the store, dont buy them.  Dont buy a shoe that doesnt match your foot shape.  What about socks?  Always wear socks with shoes. Socks help absorb sweat and reduce friction and blistering. When shopping for shoes, choose soft, padded socks with seams that dont irritate your feet.  If you have foot problems  Some foot problems cause deformities. This can make it hard to find a good fit. Look for shoes made of soft leather to stretch over the deformity. If you have bunions, buy shoes with a wider toe box. To fit hammertoes, look for shoes with a tall toe box. If you have arch problems, you may need inserts. In some cases, youll need to have custom footwear or orthoses made for your feet.  Suggested footwear  Ask your healthcare provider what kind of footwear you need. He or she may recommend a certain brand or shoe store.  Date Last Reviewed: 8/1/2016  © 2415-1980 [employer]. [address] [place] [address], [place] [address]. All rights reserved. This information is not intended as a substitute for professional medical care. Always follow your healthcare professional's instructions.

## 2022-11-14 NOTE — PROGRESS NOTES
Subjective:      Patient ID: Tresa Garcia is a 52 y.o. female.    Chief Complaint: Foot Pain (Bilateral pain)      Tresa Garcia is a 52 y.o. female who presents to the podiatry clinic  with complaint of  bilateral foot pain (L>R), especially with palpation and ambulation. Description: moderate Nature: aching, sharp, and throbbing Location: diffuse Onset of the symptoms was several years ago. Precipitating event: none known bur potentially relates to shoe choice such as converse in the past. History of injury: no Current symptoms include: ability to bear weight, but with some pain, stiffness, and worsening symptoms after a period of activity. Alleviating factors: rest Symptoms have gradually worsened.  Evaluation to date: none. Treatment to date: none. Patients rates pain 3/10 on pain scale.    Shoe gear: Nike tennis shoes  Hours on Feet: 5 hrs at [facility]    There is no problem list on file for this patient.      Current Outpatient Medications on File Prior to Visit   Medication Sig Dispense Refill    elviteg-cob-emtri-tenof ALAFEN (GENVOYA) [phone] mg Tab Take by mouth.      esomeprazole (NEXIUM) 20 MG capsule Take 20 mg by mouth before breakfast.      gabapentin (NEURONTIN) 300 MG capsule Take 2 capsules (600 mg total) by mouth every evening. 360 capsule 1    levothyroxine (SYNTHROID) 125 MCG tablet Take 125 mcg by mouth once daily.      tiZANidine (ZANAFLEX) 2 MG tablet 1-2 every 6 hours as needed for muscle pain and spasm. 15 tablet 0    albuterol (PROVENTIL/VENTOLIN HFA) 90 mcg/actuation inhaler Inhale 1-2 puffs into the lungs every 6 (six) hours as needed for Wheezing. Rescue 1 Inhaler 0    azithromycin (Z-PADMAJA) 250 MG tablet Take 1 tablet (250 mg total) by mouth once daily. Take first 2 tablets together, then 1 every day until finished. (Patient not taking: Reported on 1/19/2022) 6 tablet 0    famotidine (PEPCID) 20 MG tablet Take 1 tablet (20 mg total) by mouth 2 (two) times daily. 20  tablet 0    fluticasone-salmeterol 100-50 mcg/dose (ADVAIR) 100-50 mcg/dose diskus inhaler Inhale 1 puff into the lungs 2 (two) times daily. Controller       No current facility-administered medications on file prior to visit.       Review of patient's allergies indicates:   Allergen Reactions    Sinus allergy Anxiety, Hives, Itching, Rash and Shortness Of Breath    Fish containing products Hives    Shellfish containing products      Hives (skin)^FISH ONLY       Past Surgical History:   Procedure Laterality Date    HYSTERECTOMY      OOPHORECTOMY      PARTIAL HYSTERECTOMY         Family History   Problem Relation Age of Onset    Hypertension Mother     Cancer Mother     Diabetes Mother        Social History     Socioeconomic History    Marital status: Single   Tobacco Use    Smoking status: Every Day     Packs/day: 1.50     Years: 30.00     Pack years: 45.00     Types: Cigarettes     Last attempt to quit: 2010     Years since quittin.5    Smokeless tobacco: Never   Substance and Sexual Activity    Alcohol use: No    Drug use: No    Sexual activity: Yes     Partners: Male     Birth control/protection: Condom       Review of Systems   Constitutional: Negative for chills and fever.   Cardiovascular:  Negative for claudication and leg swelling.   Respiratory:  Negative for cough and shortness of breath.    Skin:  Negative for dry skin, itching, nail changes and rash.   Musculoskeletal:  Positive for joint pain, myalgias and stiffness. Negative for falls, joint swelling and muscle weakness.   Gastrointestinal:  Negative for diarrhea, nausea and vomiting.   Neurological:  Positive for paresthesias. Negative for numbness, tremors and weakness.   Psychiatric/Behavioral:  Negative for altered mental status and hallucinations.          Objective:      Vitals:    22 0810   BP: (!) 133/91   Pulse: 79   Weight: 73 kg (160 lb 15 oz)   Height: 5' (1.524 m)   PainSc:   3   PainLoc: Foot       Physical  Exam  Nursing note reviewed.   Constitutional:       General: She is not in acute distress.     Appearance: She is not toxic-appearing or diaphoretic.   Cardiovascular:      Pulses:           Dorsalis pedis pulses are 2+ on the right side and 2+ on the left side.        Posterior tibial pulses are 2+ on the right side and 2+ on the left side.   Pulmonary:      Effort: No respiratory distress.   Musculoskeletal:      Right ankle: Swelling present. No tenderness. No lateral malleolus, medial malleolus, AITF ligament, CF ligament or posterior TF ligament tenderness. Decreased range of motion.      Right Achilles Tendon: Tenderness (pain with palpation of posterior 1/3 calcaneus at region of Achilles tendon insertion) present. No defects. Martin's test negative.      Left ankle: Swelling present. No tenderness. No lateral malleolus, medial malleolus, AITF ligament, CF ligament or posterior TF ligament tenderness. Decreased range of motion.      Left Achilles Tendon: Tenderness (pain with palpation of posterior 1/3 calcaneus at region of Achilles tendon insertion) present. No defects. Martin's test negative.      Right foot: Crepitus (midfoot) present. No bony tenderness.      Left foot: Crepitus (midfoot) present. No bony tenderness.      Comments: Biomechanical exam: Pain on palpation bilateral medial calcaneal tubercle at origin of plantar fascia. There is equinus deformity bilateral with decreased dorsiflexion at the ankle joint bilateral. No tenderness with compression of heel. Negative tinels sign.     Decreased first MPJ range of motion both weightbearing and nonweightbearing, no crepitus observed the first MP joint, + dorsal flag sign.    Skin:     General: Skin is warm and dry.      Coloration: Skin is not pale.      Findings: No bruising, burn, laceration, lesion or rash.      Nails: There is no clubbing.   Neurological:      Sensory: No sensory deficit.      Motor: No tremor, atrophy or abnormal muscle  tone.      Deep Tendon Reflexes: Reflexes are normal and symmetric.   Psychiatric:         Attention and Perception: She is attentive.         Mood and Affect: Mood is not anxious. Affect is not inappropriate.         Speech: She is communicative. Speech is not slurred.         Behavior: Behavior is not combative.             Assessment:       Encounter Diagnoses   Name Primary?    Foot pain, bilateral Yes    Arthrosis of midfoot, unspecified laterality     Achilles tendinitis of both lower extremities     Plantar fasciitis          Plan:     Problem List Items Addressed This Visit    None  Visit Diagnoses       Foot pain, bilateral    -  Primary    Relevant Orders    X-Ray Foot Complete 3 view Bilateral (Completed)    Arthrosis of midfoot, unspecified laterality        Achilles tendinitis of both lower extremities        Plantar fasciitis                 I counseled the patient on her conditions, their implications and medical management.      Patient education on the chronic nature of arthralgias of the feet. Discussed non-surgical treatment options, including injection, supportive shoegear, inserts.     Patient instructed on adequate icing techniques. Patient should ice the affected area at least 10 minutes when inflammed. I advised the patient that extra icing would also be beneficial to ensure adequate anti inflammatory effect. I gave written and verbal instructions on stretching exercises. Patient expressed understanding. Discussed  wearing appropriate shoe gear and avoiding flats, slippers, sandals, and going barefoot. My recommendation for OTC supports is [facility].     We also discussed cortisone injections and NSAID therapy.     The patient was advised that NSAID-type medications have two very important potential side effects: gastrointestinal irritation including hemorrhage and renal injuries. She was asked to take the medication with food and to stop if she experiences any GI upset. I asked  her to call for vomiting, abdominal pain or black/bloody stools. The patient expresses understanding of these issues and questions were answered.    RTC if no improvement, at this time she will receive cortisone injections and referral to PT. Patient is amenable to plan.

## 2023-03-06 ENCOUNTER — HOSPITAL ENCOUNTER (OUTPATIENT)
Dept: RADIOLOGY | Facility: HOSPITAL | Age: 53
Discharge: HOME | End: 2023-03-06
Attending: FAMILY MEDICINE
Payer: MEDICARE

## 2023-03-27 ENCOUNTER — HOSPITAL ENCOUNTER (OUTPATIENT)
Dept: RADIOLOGY | Facility: OTHER | Age: 53
Discharge: HOME | End: 2023-03-27
Attending: FAMILY MEDICINE
Payer: MEDICARE

## 2023-03-27 DIAGNOSIS — Z12.31 ENCOUNTER FOR SCREENING MAMMOGRAM FOR BREAST CANCER: ICD-10-CM

## 2023-03-27 PROCEDURE — 77067 SCR MAMMO BI INCL CAD: CPT | Mod: 26,,, | Performed by: RADIOLOGY

## 2023-03-27 PROCEDURE — 77063 MAMMO DIGITAL SCREENING BILAT WITH TOMO: ICD-10-PCS | Mod: 26,,, | Performed by: RADIOLOGY

## 2023-03-27 PROCEDURE — 77067 SCR MAMMO BI INCL CAD: CPT | Mod: TC

## 2023-03-27 PROCEDURE — 77067 MAMMO DIGITAL SCREENING BILAT WITH TOMO: ICD-10-PCS | Mod: 26,,, | Performed by: RADIOLOGY

## 2023-03-27 PROCEDURE — 77063 BREAST TOMOSYNTHESIS BI: CPT | Mod: 26,,, | Performed by: RADIOLOGY

## 2023-04-21 ENCOUNTER — HOSPITAL ENCOUNTER (EMERGENCY)
Facility: HOSPITAL | Age: 53
Discharge: HOME | End: 2023-04-21
Attending: EMERGENCY MEDICINE
Payer: MEDICARE

## 2023-04-21 VITALS
TEMPERATURE: 99 F | WEIGHT: 160 LBS | SYSTOLIC BLOOD PRESSURE: 156 MMHG | BODY MASS INDEX: 31.25 KG/M2 | HEART RATE: 76 BPM | DIASTOLIC BLOOD PRESSURE: 88 MMHG | OXYGEN SATURATION: 95 % | RESPIRATION RATE: 16 BRPM

## 2023-04-21 DIAGNOSIS — L24.0 IRRITANT CONTACT DERMATITIS DUE TO DETERGENT: ICD-10-CM

## 2023-04-21 DIAGNOSIS — S90.561A INSECT BITE OF RIGHT ANKLE, INITIAL ENCOUNTER: Primary | ICD-10-CM

## 2023-04-21 DIAGNOSIS — L03.115 CELLULITIS OF RIGHT ANKLE: ICD-10-CM
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
## 2023-04-21 PROCEDURE — 99284 EMERGENCY DEPT VISIT MOD MDM: CPT | Mod: GC,,, | Performed by: EMERGENCY MEDICINE

## 2023-04-21 PROCEDURE — 99283 EMERGENCY DEPT VISIT LOW MDM: CPT

## 2023-04-21 PROCEDURE — 99284 PR EMERGENCY DEPT VISIT,LEVEL IV: ICD-10-PCS | Mod: GC,,, | Performed by: EMERGENCY MEDICINE

## 2023-04-21 RX ORDER — CLINDAMYCIN HYDROCHLORIDE 300 MG/1
300 CAPSULE ORAL EVERY 6 HOURS
Qty: 28 CAPSULE | Refills: 0 | Status: SHIPPED | OUTPATIENT
Start: 2023-04-21 | End: 2023-04-28

## 2023-04-21 NOTE — ED PROVIDER NOTES
"Encounter Date: 4/21/2023       History     Chief Complaint   Patient presents with    Insect Bite     Mosquito bit to right ankle x 3 weeks ago. Wound has "spread" +itching     Tresa Garcia is a 53 y.o. female with a PMH of HIV (on Genvoya, viral load undetectable in March 2023), allergies, asthma, and HTN presenting to [facility] Emergency Department for evaluation of a wound over her right ankle that began 3 weeks ago as an itchy bump she thinks was a mosquito bite and has worsened and grown. She went to  1 week ago and completed a 7 day course of bactrim without any improvement and possibly some worsening of the wound. She additionally complains of some pruritic bumps on her upper back x 3 weeks that she thinks are from using a new scented detergent. She previously had similar bumps to her arms a few years ago and was instructed by her dermatologist to stop using scented detergents or soaps; when she discontinued them, the rash went away. She denies fever, chills, cough, sore throat, airway swelling, wheezing, chest pain, dyspnea, N/V/D, abdominal pain, dysuria, sensory changes, pain with walking, pain distal to the ankle wound, or any abnormal bleeding or bruising. She denies any new medications in the last 2 months, any new supplements, new lotions, or any other new soaps.     The history is provided by the patient. No  was used.   Review of patient's allergies indicates:   Allergen Reactions    Sinus allergy Anxiety, Hives, Itching, Rash and Shortness Of Breath    Fish containing products Hives    Shellfish containing products      Hives (skin)^FISH ONLY     Past Medical History:   Diagnosis Date    Asthma     Bronchitis     Diabetes mellitus     boarderline    HIV (human immunodeficiency virus infection)     Thyroid disease      Past Surgical History:   Procedure Laterality Date    HYSTERECTOMY      OOPHORECTOMY      PARTIAL HYSTERECTOMY       Family History   Problem Relation Age of " Onset    Hypertension Mother     Cancer Mother     Diabetes Mother      Social History     Tobacco Use    Smoking status: Every Day     Packs/day: 1.50     Years: 30.00     Pack years: 45.00     Types: Cigarettes     Last attempt to quit: 2010     Years since quittin.8    Smokeless tobacco: Never   Substance Use Topics    Alcohol use: No    Drug use: No     Review of Systems    Physical Exam     Initial Vitals [23 0638]   BP Pulse Resp Temp SpO2   (!) 185/95 75 15 98 °F (36.7 °C) 99 %      MAP       --         Physical Exam    Nursing note and vitals reviewed.  Constitutional: She appears well-developed and well-nourished. She is not diaphoretic. No distress.   HENT:   Head: Normocephalic.   Right Ear: External ear normal.   Left Ear: External ear normal.   Mouth/Throat: Oropharynx is clear and moist.   Eyes: Conjunctivae and EOM are normal. No scleral icterus.   Neck: Neck supple.   Cardiovascular:  Normal rate and regular rhythm.           Pulmonary/Chest: Breath sounds normal. No stridor. No respiratory distress. She has no wheezes.   Abdominal: Abdomen is soft. There is no abdominal tenderness. There is no rebound and no guarding.   Musculoskeletal:      Cervical back: Neck supple.      Comments: Wound over right ankle with granulation tissue and clear-white exudate, see image in charge below. No fluctuance appreciated. FROM of ankle without pain, normal gait. No tenderness over medial or lateral malleoli. Normal sensation over foot. Compartment soft throughout, no lymphangitis, no tenderness over right calf or knee.      Neurological: She is alert and oriented to person, place, and time. GCS score is 15. GCS eye subscore is 4. GCS verbal subscore is 5. GCS motor subscore is 6.   Skin: Skin is warm and dry. Capillary refill takes less than 2 seconds. No abscess noted.   Hyperpigmented excoriated maculopapular rash over upper back without erythema, fluctuance, or tenderness. No purpura or  petechiae. No bullae or vesicles. No mucosal lesions. No skin sloughing    Psychiatric: She has a normal mood and affect.         ED Course   Procedures  Labs Reviewed - No data to display       Imaging Results    None          Medications - No data to display  Medical Decision Making:   Initial Assessment:   Patient is afebrile, hemodynamically stable, and in no acute distress on arrival.   She presents with 3 weeks of a worsening wound over her right anterior ankle that began as an insect bite.   She additionally complains of a pruritic rash over her upper back that she thinks may be due to a detergent started around the same time.   Ectopic - h/o asthma, shellfish allergies, and prior contact dermatitis reactions to detergents/soaps.   Differential Diagnosis:   Differential diagnoses considered include, but not limited to:  Ankle wound -   Cellulitis vs autoimmune inflammation  Erisypelas   Doubt abscess - no fluctuance   Doubt septic joint   Spider bite     Back rash -   Contact dermatitis   Eczema       ED Management:  Pts symptoms likely do to cellulitis vs an autoimmune type overreaction to insect bite.  Given pts symptoms worsened despite bactrim, will prescribe clindamycin.     Discussed plan for dc, prescription for clinda, referral for wound care, and strict return precautions and the pt expressed understanding and agreement.           Attending Attestation:   Physician Attestation Statement for Resident:  As the supervising MD   Physician Attestation Statement: I have personally seen and examined this patient.   I agree with the above history.  -:   As the supervising MD I agree with the above PE.     As the supervising MD I agree with the above treatment, course, plan, and disposition.                               Clinical Impression:   Final diagnoses:  [S90.561A, W57.XXXA] Insect bite of right ankle, initial encounter (Primary)  [L03.115] Cellulitis of right ankle  [L24.0] Irritant contact dermatitis  due to detergent        ED Disposition Condition    Discharge Stable          ED Prescriptions       Medication Sig Dispense Start Date End Date Auth. Provider    clindamycin (CLEOCIN) 300 MG capsule Take 1 capsule (300 mg total) by mouth every 6 (six) hours. for 7 days 28 capsule 4/21/2023 4/28/2023 Li Lua MD          Follow-up Information       Follow up With Specialties Details Why Contact Info    Nadya Troy MD Infectious Diseases   [address] [facility] [address]  [phone]      [facility] - Emergency Dept Emergency Medicine  If symptoms worsen [address] [address]  [phone]             Li Lua MD  Resident  04/21/23 2047       Opal See MD  04/22/23 0811

## 2023-04-21 NOTE — Clinical Note
"Tresa Ravi" Radha was seen and treated in our emergency department on 4/21/2023.  She may return to work on 04/22/2023.       If you have any questions or concerns, please don't hesitate to call.      Damaris Ortiz RN    "

## 2023-04-21 NOTE — DISCHARGE INSTRUCTIONS
Diagnosis: cellulitis       Follow-Up Plan:  - Complete course of clindamycin  - Return to the ED if symptoms worsen or do not improve   - Follow-up with primary care doctor within 3 - 5 days  - Additional testing and/or evaluation as directed by your primary doctor    Return to the Emergency Department for symptoms including but not limited to: worsening symptoms, shortness of breath or chest pain, vomiting with inability to hold down fluids, fevers greater than 100.4°F, passing out/fainting/unconsciousness, or other concerning symptoms.

## 2023-04-21 NOTE — ED TRIAGE NOTES
"Tresa Garcia, a 53 y.o. female presents to the ED w/ complaint of mosquito bite to R ankle. Pt ambulatory in triage.     Triage note:  Chief Complaint   Patient presents with    Insect Bite     Mosquito bit to right ankle x 3 weeks ago. Wound has "spread" +itching     Review of patient's allergies indicates:   Allergen Reactions    Sinus allergy Anxiety, Hives, Itching, Rash and Shortness Of Breath    Fish containing products Hives    Shellfish containing products      Hives (skin)^FISH ONLY     Past Medical History:   Diagnosis Date    Asthma     Bronchitis     Diabetes mellitus     boarderline    HIV (human immunodeficiency virus infection)     Thyroid disease          "

## 2023-04-24 ENCOUNTER — TELEPHONE (OUTPATIENT)
Dept: WOUND CARE | Facility: HOSPITAL | Age: 53
End: 2023-04-24
Payer: MEDICARE

## 2023-04-24 NOTE — TELEPHONE ENCOUNTER
Called patient in regards to wound care referral. No answer, left voicemail to return call to #[phone] option 1

## 2023-05-01 ENCOUNTER — HOSPITAL ENCOUNTER (OUTPATIENT)
Dept: WOUND CARE | Facility: HOSPITAL | Age: 53
Discharge: HOME | End: 2023-05-01
Attending: PREVENTIVE MEDICINE
Payer: MEDICARE

## 2023-05-01 VITALS
HEART RATE: 75 BPM | BODY MASS INDEX: 31.41 KG/M2 | HEIGHT: 60 IN | SYSTOLIC BLOOD PRESSURE: 152 MMHG | DIASTOLIC BLOOD PRESSURE: 90 MMHG | TEMPERATURE: 98 F | WEIGHT: 160 LBS

## 2023-05-01 DIAGNOSIS — I87.2 VENOUS STASIS DERMATITIS, UNSPECIFIED LATERALITY: ICD-10-CM

## 2023-05-01 DIAGNOSIS — L03.115 CELLULITIS OF RIGHT ANKLE: Primary | ICD-10-CM

## 2023-05-01 PROCEDURE — 99213 OFFICE O/P EST LOW 20 MIN: CPT

## 2023-05-01 RX ORDER — DOXYCYCLINE HYCLATE 100 MG
100 TABLET ORAL EVERY 12 HOURS
Qty: 14 TABLET | Refills: 0 | Status: SHIPPED | OUTPATIENT
Start: 2023-05-01 | End: 2023-05-08

## 2023-05-01 NOTE — PROGRESS NOTES
[facility]    Subjective:       Patient ID: Tresa Garcia is a 53 y.o. female.    Chief Complaint: Non-healing Wound    Admit to wound right ankle cellulitis. States wound present over a month and she's not sure if something bit her. HX Thyroid disease and HVI.  Currently on Clindamycin Po 300 mg since 4/21/23. States drainage has lessened  and wound itches more since taking antibiotics.  Patient has been leaving wound open to air. Denies any fever, pain or increased drainage from wound.   Review of Systems   All other systems reviewed and are negative.      Objective:     Vitals:    05/01/23 0929   BP: (!) 152/90   Pulse: 75   Temp: 97.5 °F (36.4 °C)         Physical Exam       Altered Skin Integrity 05/01/23 0900 Right anterior;distal;lower Leg Ulceration Partial thickness tissue loss. Shallow open ulcer with a red or pink wound bed, without slough. Intact or Open/Ruptured Serum-filled blister. (Active)   05/01/23 0900   Altered Skin Integrity Present on Admission - Did Patient arrive to the hospital with altered skin?: yes   Side: Right   Orientation: anterior;distal;lower   Location: Leg   Wound Number:    Is this injury device related?:    Primary Wound Type: Ulceration   Description of Altered Skin Integrity: Partial thickness tissue loss. Shallow open ulcer with a red or pink wound bed, without slough. Intact or Open/Ruptured Serum-filled blister.   Ankle-Brachial Index:    Pulses:    Removal Indication and Assessment:    Wound Outcome:    (Retired) Wound Length (cm):    (Retired) Wound Width (cm):    (Retired) Depth (cm):    Wound Description (Comments):    Removal Indications:    Wound Image   05/01/23 0945   Description of Altered Skin Integrity Partial thickness tissue loss. Shallow open ulcer with a red or pink wound bed, without slough. Intact or Open/Ruptured Serum-filled blister. 05/01/23 0945   Dressing Appearance Intact;Moist drainage 05/01/23  0945   Drainage Amount Scant 05/01/23 0945   Drainage Characteristics/Odor Serous 05/01/23 0945   Appearance Red;Moist 05/01/23 0945   Tissue loss description Partial thickness 05/01/23 0945   Red (%), Wound Tissue Color 100 % 05/01/23 0945   Periwound Area Intact;Dry 05/01/23 0945   Wound Edges Undefined 05/01/23 0945   Wound Length (cm) 6.5 cm 05/01/23 0945   Wound Width (cm) 4.5 cm 05/01/23 0945   Wound Depth (cm) 0.1 cm 05/01/23 0945   Wound Volume (cm^3) 2.925 cm^3 05/01/23 0945   Wound Surface Area (cm^2) 29.25 cm^2 05/01/23 0945   Care Cleansed with:;Sterile normal saline 05/01/23 0945   Dressing Applied;Island/border;Tubular bandage 05/01/23 0945   Periwound Care Absorptive dressing applied;Dry periwound area maintained 05/01/23 0945   Compression Tubular elasticized bandage 05/01/23 0945   Dressing Change Due 05/08/23 05/01/23 0945         Assessment/Plan:         ICD-10-CM ICD-9-CM   1. Cellulitis of right ankle  L03.115 682.6   2. Venous stasis dermatitis, unspecified laterality  I87.2 454.1           Tissue pathology and/or culture taken:  [] Yes [x] No   Sharp debridement performed:   [] Yes [x] No   Labs ordered this visit:   [] Yes [x] No   Imaging ordered this visit:   [x] Yes [] No           Orders Placed This Encounter   Procedures    US Lower Extremity Veins Bilateral Insufficiency     Standing Status:   Future     Standing Expiration Date:   5/1/2024     Order Specific Question:   May the Radiologist modify the order per protocol to meet the clinical needs of the patient?     Answer:   Yes     Order Specific Question:   Release to patient     Answer:   Immediate    Ambulatory referral/consult to Wound Clinic     Standing Status:   Standing     Number of Occurrences:   1     Referral Priority:   Routine     Referral Type:   Consultation     Referral Reason:   Specialty Services Required     Requested Specialty:   Wound Care     Number of Visits Requested:   1    Change dressing     Right  Ankle  Cleanse wound with:Normal Saline  Lidocaine:PRN  Silver nitrate:PRN  Periwound care:Maintain dry rachael wound  Primary dressing:Xeroform  Secondary dressing:Island Border or Large Band-aid  Edema control: Tubi  E toes to knee  Frequency:Weekly and PRN per patient.   Follow-up:weekly     Other Orders: RX sent to pharmacy for Doxycyline Po  US ble veins ordered.        Follow up in about 1 week (around 5/8/2023) for Dr. Simmons.

## 2023-05-08 ENCOUNTER — HOSPITAL ENCOUNTER (OUTPATIENT)
Dept: WOUND CARE | Facility: HOSPITAL | Age: 53
Discharge: HOME | End: 2023-05-08
Attending: PREVENTIVE MEDICINE
Payer: MEDICARE

## 2023-05-08 VITALS
TEMPERATURE: 97 F | DIASTOLIC BLOOD PRESSURE: 85 MMHG | HEART RATE: 79 BPM | HEIGHT: 60 IN | SYSTOLIC BLOOD PRESSURE: 177 MMHG | WEIGHT: 160 LBS | BODY MASS INDEX: 31.41 KG/M2

## 2023-05-08 DIAGNOSIS — I87.2 VENOUS STASIS DERMATITIS OF RIGHT LOWER EXTREMITY: ICD-10-CM

## 2023-05-08 DIAGNOSIS — L03.115 CELLULITIS OF RIGHT ANKLE: Primary | ICD-10-CM

## 2023-05-08 PROCEDURE — 99212 OFFICE O/P EST SF 10 MIN: CPT

## 2023-05-08 NOTE — PROGRESS NOTES
[facility]    Subjective:       Patient ID: Tresa aGrcia is a 53 y.o. female.    Chief Complaint: Wound Care    Follow up for Right ankle wound. Progressing well, continue with same plan of care.  Review of Systems   All other systems reviewed and are negative.      Objective:     Vitals:    05/08/23 0943   BP: (!) 177/85   Pulse: 79   Temp: 96.8 °F (36 °C)         Physical Exam       Altered Skin Integrity 05/01/23 0900 Right anterior;distal;lower Leg Ulceration Partial thickness tissue loss. Shallow open ulcer with a red or pink wound bed, without slough. Intact or Open/Ruptured Serum-filled blister. (Active)   05/01/23 0900   Altered Skin Integrity Present on Admission - Did Patient arrive to the hospital with altered skin?: yes   Side: Right   Orientation: anterior;distal;lower   Location: Leg   Wound Number:    Is this injury device related?:    Primary Wound Type: Ulceration   Description of Altered Skin Integrity: Partial thickness tissue loss. Shallow open ulcer with a red or pink wound bed, without slough. Intact or Open/Ruptured Serum-filled blister.   Ankle-Brachial Index:    Pulses:    Removal Indication and Assessment:    Wound Outcome:    (Retired) Wound Length (cm):    (Retired) Wound Width (cm):    (Retired) Depth (cm):    Wound Description (Comments):    Removal Indications:    Wound Image   05/08/23 0946   Description of Altered Skin Integrity Partial thickness tissue loss. Shallow open ulcer with a red or pink wound bed, without slough. Intact or Open/Ruptured Serum-filled blister. 05/08/23 0946   Dressing Appearance Intact;Moist drainage 05/08/23 0946   Drainage Amount Scant 05/08/23 0946   Drainage Characteristics/Odor Serous 05/08/23 0946   Appearance Red;Moist 05/08/23 0946   Tissue loss description Partial thickness 05/08/23 0946   Red (%), Wound Tissue Color 100 % 05/08/23 0946   Periwound Area Intact;Dry 05/08/23 0946   Wound Edges  Undefined 05/08/23 0946   Wound Length (cm) 6.4 cm 05/08/23 0946   Wound Width (cm) 4.5 cm 05/08/23 0946   Wound Depth (cm) 0 cm 05/08/23 0946   Wound Volume (cm^3) 0 cm^3 05/08/23 0946   Wound Surface Area (cm^2) 28.8 cm^2 05/08/23 0946   Care Cleansed with:;Soap and water;Sterile normal saline 05/08/23 0946   Dressing Applied;Island/border;Tubular bandage 05/08/23 0946   Periwound Care Absorptive dressing applied;Dry periwound area maintained 05/08/23 0946   Compression Tubular elasticized bandage 05/08/23 0946   Dressing Change Due 05/22/23 05/08/23 0946         Assessment/Plan:         ICD-10-CM ICD-9-CM   1. Cellulitis of right ankle  L03.115 682.6   2. Venous stasis dermatitis of right lower extremity  I87.2 454.1       Imaging pending.    Tissue pathology and/or culture taken:  [] Yes [x] No   Sharp debridement performed:   [] Yes [x] No   Labs ordered this visit:   [] Yes [x] No   Imaging ordered this visit:   [] Yes [x] No         Right Ankle   Cleanse wound with:Normal Saline   Lidocaine:PRN   Silver nitrate:PRN   Periwound care:Maintain dry rachael wound   Primary dressing:Xeroform   Secondary dressing:Island Border or Large Band-aid   Edema control: Tubi  E toes to knee   Frequency:Weekly and PRN per patient.   Follow-up: 2 weeks         Follow up in about 2 weeks (around 5/22/2023).

## 2023-05-10 ENCOUNTER — HOSPITAL ENCOUNTER (OUTPATIENT)
Dept: RADIOLOGY | Facility: OTHER | Age: 53
Discharge: HOME | End: 2023-05-10
Attending: PREVENTIVE MEDICINE
Payer: MEDICARE

## 2023-05-10 DIAGNOSIS — L03.115 CELLULITIS OF RIGHT ANKLE: ICD-10-CM

## 2023-05-10 PROCEDURE — 93970 EXTREMITY STUDY: CPT | Mod: 26,,, | Performed by: RADIOLOGY

## 2023-05-10 PROCEDURE — 93970 US LOWER EXTREMITY VEINS BILATERAL INSUFFICIENCY: ICD-10-PCS | Mod: 26,,, | Performed by: RADIOLOGY

## 2023-05-10 PROCEDURE — 93970 EXTREMITY STUDY: CPT | Mod: TC

## 2023-05-29 ENCOUNTER — HOSPITAL ENCOUNTER (OUTPATIENT)
Dept: WOUND CARE | Facility: HOSPITAL | Age: 53
Discharge: HOME | End: 2023-05-29
Attending: PREVENTIVE MEDICINE
Payer: MEDICARE

## 2023-05-29 DIAGNOSIS — B36.9 FUNGAL DERMATITIS: Primary | ICD-10-CM

## 2023-05-29 DIAGNOSIS — L03.115 CELLULITIS OF RIGHT ANKLE: ICD-10-CM

## 2023-05-29 PROCEDURE — 99214 OFFICE O/P EST MOD 30 MIN: CPT

## 2023-05-29 RX ORDER — CLOTRIMAZOLE AND BETAMETHASONE DIPROPIONATE 10; .64 MG/G; MG/G
CREAM TOPICAL 2 TIMES DAILY
Qty: 45 G | Refills: 1 | Status: SHIPPED | OUTPATIENT
Start: 2023-05-29

## 2023-05-29 NOTE — PROGRESS NOTES
[facility]    Subjective:       Patient ID: Tresa Garcia is a 53 y.o. female.    Chief Complaint: Wound Care    HPI  New areas of breakdown to Left leg, thigh, and back.  Consult Dermatology.  Review of Systems   All other systems reviewed and are negative.      Objective:         Physical Exam       Altered Skin Integrity 05/01/23 0900 Right anterior Leg #3 Ulceration Partial thickness tissue loss. Shallow open ulcer with a red or pink wound bed, without slough. Intact or Open/Ruptured Serum-filled blister. (Active)   05/01/23 0900   Altered Skin Integrity Present on Admission - Did Patient arrive to the hospital with altered skin?: yes   Side: Right   Orientation: anterior   Location: Leg   Wound Number: #3   Is this injury device related?:    Primary Wound Type: Ulceration   Description of Altered Skin Integrity: Partial thickness tissue loss. Shallow open ulcer with a red or pink wound bed, without slough. Intact or Open/Ruptured Serum-filled blister.   Ankle-Brachial Index:    Pulses:    Removal Indication and Assessment:    Wound Outcome:    (Retired) Wound Length (cm):    (Retired) Wound Width (cm):    (Retired) Depth (cm):    Wound Description (Comments):    Removal Indications:    Wound Image   05/29/23 1520   Description of Altered Skin Integrity Intact skin with non-blanchable redness of localized area 05/29/23 1520   Dressing Appearance Moist drainage 05/29/23 1520   Drainage Amount Large 05/29/23 1520   Drainage Characteristics/Odor Serosanguineous 05/29/23 1520   Appearance Pink;Moist 05/29/23 1520   Tissue loss description Partial thickness 05/29/23 1520   Red (%), Wound Tissue Color 100 % 05/29/23 1520   Periwound Area Petechiae;Redness;Swelling;Moist 05/29/23 1520   Wound Edges Undefined 05/29/23 1520   Wound Length (cm) 9 cm 05/29/23 1520   Wound Width (cm) 8.1 cm 05/29/23 1520   Wound Depth (cm) 0 cm 05/29/23 1520   Wound Volume (cm^3) 0 cm^3  05/29/23 1520   Wound Surface Area (cm^2) 72.9 cm^2 05/29/23 1520   Care Cleansed with:;Antimicrobial agent;Sterile normal saline 05/29/23 1520   Dressing Non-adherent;Gauze;Absorptive Pad;Rolled gauze 05/29/23 1520   Periwound Care Absorptive dressing applied;Moisture barrier applied 05/29/23 1520   Compression Tubular elasticized bandage 05/29/23 1520   Dressing Change Due 06/05/23 05/29/23 1520            Altered Skin Integrity Left medial Foot #2 Moisture associated dermatitis Intact skin with non-blanchable redness of localized area (Active)       Altered Skin Integrity Present on Admission - Did Patient arrive to the hospital with altered skin?:    Side: Left   Orientation: medial   Location: Foot   Wound Number: #2   Is this injury device related?:    Primary Wound Type: Moisture ass   Description of Altered Skin Integrity: Intact skin with non-blanchable redness of localized area   Ankle-Brachial Index:    Pulses:    Removal Indication and Assessment:    Wound Outcome:    (Retired) Wound Length (cm):    (Retired) Wound Width (cm):    (Retired) Depth (cm):    Wound Description (Comments):    Removal Indications:    Wound Image   05/29/23 1520   Description of Altered Skin Integrity Intact skin with non-blanchable redness of localized area 05/29/23 1520   Dressing Appearance Moist drainage 05/29/23 1520   Drainage Amount Moderate 05/29/23 1520   Drainage Characteristics/Odor Serosanguineous 05/29/23 1520   Tissue loss description Partial thickness 05/29/23 1520   Red (%), Wound Tissue Color 100 % 05/29/23 1520   Periwound Area Moist;Petechiae 05/29/23 1520   Wound Length (cm) 2 cm 05/29/23 1520   Wound Width (cm) 2.1 cm 05/29/23 1520   Wound Depth (cm) 0.1 cm 05/29/23 1520   Wound Volume (cm^3) 0.42 cm^3 05/29/23 1520   Wound Surface Area (cm^2) 4.2 cm^2 05/29/23 1520   Care Cleansed with:;Antimicrobial agent 05/29/23 1520   Dressing Non-adherent;Absorptive Pad;Gauze;Island/border 05/29/23 1520   Periwound  Care Absorptive dressing applied;Moisture barrier applied 05/29/23 1520   Compression Tubular elasticized bandage 05/29/23 1520   Dressing Change Due 06/05/23 05/29/23 1520            Altered Skin Integrity 05/29/23 1500 Left anterior;lower Knee #4 Moisture associated dermatitis (Active)   05/29/23 1500   Altered Skin Integrity Present on Admission - Did Patient arrive to the hospital with altered skin?:    Side: Left   Orientation: anterior;lower   Location: Knee   Wound Number: #4   Is this injury device related?: No   Primary Wound Type: Moisture ass   Description of Altered Skin Integrity:    Ankle-Brachial Index:    Pulses:    Removal Indication and Assessment:    Wound Outcome:    (Retired) Wound Length (cm):    (Retired) Wound Width (cm):    (Retired) Depth (cm):    Wound Description (Comments):    Removal Indications:    Wound Image   05/29/23 1520   Description of Altered Skin Integrity Partial thickness tissue loss. Shallow open ulcer with a red or pink wound bed, without slough. Intact or Open/Ruptured Serum-filled blister. 05/29/23 1520   Dressing Appearance Moist drainage 05/29/23 1520   Drainage Amount Moderate 05/29/23 1520   Drainage Characteristics/Odor Serosanguineous 05/29/23 1520   Appearance Red 05/29/23 1520   Tissue loss description Partial thickness 05/29/23 1520   Red (%), Wound Tissue Color 100 % 05/29/23 1520   Periwound Area Intact;Moist;Petechiae;Edematous;Swelling 05/29/23 1520   Wound Edges Undefined 05/29/23 1520   Wound Length (cm) 4 cm 05/29/23 1520   Wound Width (cm) 3.2 cm 05/29/23 1520   Wound Depth (cm) 0.1 cm 05/29/23 1520   Wound Volume (cm^3) 1.28 cm^3 05/29/23 1520   Wound Surface Area (cm^2) 12.8 cm^2 05/29/23 1520   Care Cleansed with:;Antimicrobial agent;Sterile normal saline 05/29/23 1520   Dressing Non-adherent;Island/border 05/29/23 1520   Periwound Care Moisture barrier applied 05/29/23 1520   Compression Tubular elasticized bandage 05/29/23 1520            Altered  Skin Integrity 05/29/23 1500 Left Back #5 Moisture associated dermatitis (Active)   05/29/23 1500   Altered Skin Integrity Present on Admission - Did Patient arrive to the hospital with altered skin?:    Side: Left   Orientation:    Location: Back   Wound Number: #5   Is this injury device related?:    Primary Wound Type: Moisture ass   Description of Altered Skin Integrity:    Ankle-Brachial Index:    Pulses:    Removal Indication and Assessment:    Wound Outcome:    (Retired) Wound Length (cm):    (Retired) Wound Width (cm):    (Retired) Depth (cm):    Wound Description (Comments):    Removal Indications:    Wound Image   05/29/23 1520   Description of Altered Skin Integrity Partial thickness tissue loss. Shallow open ulcer with a red or pink wound bed, without slough. Intact or Open/Ruptured Serum-filled blister. 05/29/23 1520   Dressing Appearance Moist drainage 05/29/23 1520   Drainage Amount Moderate 05/29/23 1520   Drainage Characteristics/Odor Serosanguineous 05/29/23 1520   Tissue loss description Partial thickness 05/29/23 1520   Black (%), Wound Tissue Color 100 % 05/29/23 1520   Periwound Area Moist 05/29/23 1520   Wound Edges Undefined 05/29/23 1520   Wound Length (cm) 2 cm 05/29/23 1520   Wound Width (cm) 3.5 cm 05/29/23 1520   Wound Depth (cm) 0 cm 05/29/23 1520   Wound Volume (cm^3) 0 cm^3 05/29/23 1520   Wound Surface Area (cm^2) 7 cm^2 05/29/23 1520   Care Cleansed with:;Antimicrobial agent;Sterile normal saline 05/29/23 1520   Dressing Applied;Non-adherent;Island/border 05/29/23 1520   Periwound Care Moisture barrier applied 05/29/23 1520   Dressing Change Due 06/05/23 05/29/23 1520            Altered Skin Integrity 05/29/23 1500 Left posterior Thigh #5 (Active)   05/29/23 1500   Altered Skin Integrity Present on Admission - Did Patient arrive to the hospital with altered skin?:    Side: Left   Orientation: posterior   Location: Thigh   Wound Number: #5   Is this injury device related?:    Primary  Wound Type:    Description of Altered Skin Integrity:    Ankle-Brachial Index:    Pulses:    Removal Indication and Assessment:    Wound Outcome:    (Retired) Wound Length (cm):    (Retired) Wound Width (cm):    (Retired) Depth (cm):    Wound Description (Comments):    Removal Indications:    Wound Image   05/29/23 1520   Description of Altered Skin Integrity Partial thickness tissue loss. Shallow open ulcer with a red or pink wound bed, without slough. Intact or Open/Ruptured Serum-filled blister. 05/29/23 1520   Dressing Appearance Moist drainage 05/29/23 1520   Drainage Amount Moderate 05/29/23 1520   Drainage Characteristics/Odor Serosanguineous 05/29/23 1520   Appearance Pink;Yellow;Black 05/29/23 1520   Tissue loss description Partial thickness 05/29/23 1520   Black (%), Wound Tissue Color 75 % 05/29/23 1520   Red (%), Wound Tissue Color 25 % 05/29/23 1520   Periwound Area Intact 05/29/23 1520   Wound Edges Irregular 05/29/23 1520   Wound Length (cm) 2 cm 05/29/23 1520   Wound Width (cm) 2 cm 05/29/23 1520   Wound Depth (cm) 0 cm 05/29/23 1520   Wound Volume (cm^3) 0 cm^3 05/29/23 1520   Wound Surface Area (cm^2) 4 cm^2 05/29/23 1520   Care Cleansed with:;Antimicrobial agent;Sterile normal saline 05/29/23 1520   Dressing Island/border 05/29/23 1520   Periwound Care Moisture barrier applied 05/29/23 1520   Dressing Change Due 06/05/23 05/29/23 1520         Assessment/Plan:         ICD-10-CM ICD-9-CM   1. Fungal dermatitis  B36.9 111.9   2. Cellulitis of right ankle  L03.115 682.6       Increase in lesions. Concern for fungal component. Consider oral Lamisil.    Tissue pathology and/or culture taken:  [] Yes [x] No   Sharp debridement performed:   [] Yes [x] No   Labs ordered this visit:   [] Yes [x] No   Imaging ordered this visit:   [] Yes [x] No           Orders Placed This Encounter   Procedures    Ambulatory referral/consult to Dermatology     Standing Status:   Future     Standing Expiration Date:    6/29/2024     Referral Priority:   Routine     Referral Type:   Consultation     Referral Reason:   Specialty Services Required     Requested Specialty:   Dermatology     Number of Visits Requested:   1    Change dressing     R lateral ankle, L medial ankle, L knee, L posterior thigh, L posterior upper back    Cleanse with vashe and rinse with NS  Primary Dressing: Apply antifungal cream  Secondary:  Adaptic  R Lateral ankle:  ABD pad and kerlix  All other areas Mepilex border  Compression:  Tubi- D x 2 toe to knee to bilateral legs   Other orders:  Prescription for triamcinolone cream with antifungal twice daily  Referral for dermatology  Wound supplies to be ordered        Follow up in 1 week

## 2023-06-05 ENCOUNTER — HOSPITAL ENCOUNTER (OUTPATIENT)
Dept: WOUND CARE | Facility: HOSPITAL | Age: 53
Discharge: HOME | End: 2023-06-05
Attending: PREVENTIVE MEDICINE
Payer: MEDICARE

## 2023-06-05 VITALS
DIASTOLIC BLOOD PRESSURE: 79 MMHG | RESPIRATION RATE: 18 BRPM | HEART RATE: 73 BPM | TEMPERATURE: 96 F | SYSTOLIC BLOOD PRESSURE: 146 MMHG

## 2023-06-05 DIAGNOSIS — I87.2 VENOUS STASIS DERMATITIS OF RIGHT LOWER EXTREMITY: ICD-10-CM

## 2023-06-05 DIAGNOSIS — B36.9 FUNGAL DERMATITIS: Primary | ICD-10-CM

## 2023-06-05 PROCEDURE — 99214 OFFICE O/P EST MOD 30 MIN: CPT

## 2023-06-05 RX ORDER — TERBINAFINE HYDROCHLORIDE 250 MG/1
250 TABLET ORAL DAILY
Qty: 60 TABLET | Refills: 0 | Status: SHIPPED | OUTPATIENT
Start: 2023-06-05

## 2023-06-05 NOTE — PROGRESS NOTES
[facility]    Subjective:       Patient ID: Tresa Garcia is a 53 y.o. female.    Chief Complaint: Non-healing Wound    Wound care follow up. Patient reports areas have improved. All areas with newly epithelialized tissue. Plan to continue Lotrisone topically and start Terbinafine. Return to clinic in 3 weeks.   Review of Systems   All other systems reviewed and are negative.      Objective:     Vitals:    06/05/23 0821   BP: (!) 146/79   Pulse: 73   Resp: 18   Temp: 96.3 °F (35.7 °C)         Physical Exam       Altered Skin Integrity 05/01/23 0900 Right anterior Leg #3 Ulceration Partial thickness tissue loss. Shallow open ulcer with a red or pink wound bed, without slough. Intact or Open/Ruptured Serum-filled blister. (Active)   05/01/23 0900   Altered Skin Integrity Present on Admission - Did Patient arrive to the hospital with altered skin?: yes   Side: Right   Orientation: anterior   Location: Leg   Wound Number: #3   Is this injury device related?:    Primary Wound Type: Ulceration   Description of Altered Skin Integrity: Partial thickness tissue loss. Shallow open ulcer with a red or pink wound bed, without slough. Intact or Open/Ruptured Serum-filled blister.   Ankle-Brachial Index:    Pulses:    Removal Indication and Assessment:    Wound Outcome:    (Retired) Wound Length (cm):    (Retired) Wound Width (cm):    (Retired) Depth (cm):    Wound Description (Comments):    Removal Indications:    Wound Image   06/05/23 0823   Dressing Appearance Moist drainage 06/05/23 0823   Drainage Amount Scant 06/05/23 0823   Drainage Characteristics/Odor Serous 06/05/23 0823   Tissue loss description Partial thickness 06/05/23 0823   Wound Edges Undefined 06/05/23 0823   Wound Length (cm) 2 cm 06/05/23 0823   Wound Width (cm) 2 cm 06/05/23 0823   Wound Depth (cm) 0.1 cm 06/05/23 0823   Wound Volume (cm^3) 0.4 cm^3 06/05/23 0823   Wound Surface Area (cm^2) 4 cm^2  06/05/23 0823   Care Cleansed with:;Sterile normal saline 06/05/23 0823   Dressing Applied;Other (comment);Non-adherent;Rolled gauze;Tubular bandage 06/05/23 0823   Compression Tubular elasticized bandage 06/05/23 0823   Dressing Change Due 06/06/23 06/05/23 0823            Altered Skin Integrity Left medial Foot #2 Moisture associated dermatitis Intact skin with non-blanchable redness of localized area (Active)       Altered Skin Integrity Present on Admission - Did Patient arrive to the hospital with altered skin?:    Side: Left   Orientation: medial   Location: Foot   Wound Number: #2   Is this injury device related?:    Primary Wound Type: Moisture ass   Description of Altered Skin Integrity: Intact skin with non-blanchable redness of localized area   Ankle-Brachial Index:    Pulses:    Removal Indication and Assessment:    Wound Outcome:    (Retired) Wound Length (cm):    (Retired) Wound Width (cm):    (Retired) Depth (cm):    Wound Description (Comments):    Removal Indications:    Wound Image   06/05/23 0823   Dressing Appearance Intact 06/05/23 0823   Drainage Amount None 06/05/23 0823   Appearance Epithelialization 06/05/23 0823   Wound Edges Rolled/closed 06/05/23 0823   Wound Length (cm) 0 cm 06/05/23 0823   Wound Width (cm) 0 cm 06/05/23 0823   Wound Depth (cm) 0 cm 06/05/23 0823   Wound Volume (cm^3) 0 cm^3 06/05/23 0823   Wound Surface Area (cm^2) 0 cm^2 06/05/23 0823   Care Cleansed with:;Sterile normal saline 06/05/23 0823   Dressing Applied;Other (comment) 06/05/23 0823   Compression Tubular elasticized bandage 06/05/23 0823   Dressing Change Due 06/06/23 06/05/23 0823            Altered Skin Integrity 05/29/23 1500 Left anterior;lower Knee #4 Moisture associated dermatitis (Active)   05/29/23 1500   Altered Skin Integrity Present on Admission - Did Patient arrive to the hospital with altered skin?:    Side: Left   Orientation: anterior;lower   Location: Knee   Wound Number: #4   Is this injury  device related?: No   Primary Wound Type: Moisture ass   Description of Altered Skin Integrity:    Ankle-Brachial Index:    Pulses:    Removal Indication and Assessment:    Wound Outcome:    (Retired) Wound Length (cm):    (Retired) Wound Width (cm):    (Retired) Depth (cm):    Wound Description (Comments):    Removal Indications:    Wound Image   06/05/23 0823   Dressing Appearance Intact 06/05/23 0823   Drainage Amount None 06/05/23 0823   Appearance Epithelialization 06/05/23 0823   Wound Edges Rolled/closed 06/05/23 0823   Wound Length (cm) 0 cm 06/05/23 0823   Wound Width (cm) 0 cm 06/05/23 0823   Wound Depth (cm) 0 cm 06/05/23 0823   Wound Volume (cm^3) 0 cm^3 06/05/23 0823   Wound Surface Area (cm^2) 0 cm^2 06/05/23 0823   Care Cleansed with:;Sterile normal saline 06/05/23 0823   Dressing Other (comment) 06/05/23 0823   Compression Tubular elasticized bandage 06/05/23 0823   Dressing Change Due 06/06/23 06/05/23 0823            Altered Skin Integrity 05/29/23 1500 Left Back #5 Moisture associated dermatitis (Active)   05/29/23 1500   Altered Skin Integrity Present on Admission - Did Patient arrive to the hospital with altered skin?:    Side: Left   Orientation:    Location: Back   Wound Number: #5   Is this injury device related?:    Primary Wound Type: Moisture ass   Description of Altered Skin Integrity:    Ankle-Brachial Index:    Pulses:    Removal Indication and Assessment:    Wound Outcome:    (Retired) Wound Length (cm):    (Retired) Wound Width (cm):    (Retired) Depth (cm):    Wound Description (Comments):    Removal Indications:    Wound Image   06/05/23 0823   Dressing Appearance Open to air 06/05/23 0823   Drainage Amount None 06/05/23 0823   Appearance Epithelialization 06/05/23 0823   Wound Edges Rolled/closed 06/05/23 0823   Wound Length (cm) 0 cm 06/05/23 0823   Wound Width (cm) 0 cm 06/05/23 0823   Wound Depth (cm) 0 cm 06/05/23 0823   Wound Volume (cm^3) 0 cm^3 06/05/23 0823   Wound Surface  Area (cm^2) 0 cm^2 06/05/23 0823   Care Cleansed with:;Sterile normal saline 06/05/23 0823   Dressing Applied;Other (comment) 06/05/23 0823   Dressing Change Due 06/06/23 06/05/23 0823         Assessment/Plan:         ICD-10-CM ICD-9-CM   1. Fungal dermatitis  B36.9 111.9   2. Venous stasis dermatitis of right lower extremity  I87.2 454.1           Tissue pathology and/or culture taken:  [] Yes [x] No   Sharp debridement performed:   [] Yes [x] No   Labs ordered this visit:   [] Yes [x] No   Imaging ordered this visit:   [] Yes [x] No           Orders Placed This Encounter   Procedures    Change dressing     R lateral ankle, L medial ankle, L knee, L posterior thigh, L posterior upper back     Cleanse with vashe and rinse with NS   Primary Dressing: Apply Lotrisone to all areas  R Lateral ankle:  Apply Lotrisone, adaptic, kerlix   All other areas open to air   Compression:  Tubi- D x 2 toe to knee to bilateral legs   Frequency: daily  Follow up:  in 3 weeks  Other orders:  6/5/23 rx terbinafine x 90 days  Referral for dermatology ordered 5/29/23        Follow up in about 3 weeks (around 6/26/2023).

## 2023-06-26 ENCOUNTER — HOSPITAL ENCOUNTER (OUTPATIENT)
Dept: WOUND CARE | Facility: HOSPITAL | Age: 53
Discharge: HOME | End: 2023-06-26
Attending: PREVENTIVE MEDICINE
Payer: MEDICARE

## 2023-06-26 VITALS
SYSTOLIC BLOOD PRESSURE: 163 MMHG | WEIGHT: 160 LBS | DIASTOLIC BLOOD PRESSURE: 76 MMHG | HEART RATE: 68 BPM | BODY MASS INDEX: 31.41 KG/M2 | HEIGHT: 60 IN | TEMPERATURE: 96 F

## 2023-06-26 DIAGNOSIS — I87.2 VENOUS STASIS DERMATITIS OF RIGHT LOWER EXTREMITY: ICD-10-CM

## 2023-06-26 DIAGNOSIS — B36.9 FUNGAL DERMATITIS: Primary | ICD-10-CM

## 2023-06-26 PROCEDURE — 99213 OFFICE O/P EST LOW 20 MIN: CPT

## 2023-06-26 NOTE — PROGRESS NOTES
[facility]    Subjective:       Patient ID: Tresa Garica is a 53 y.o. female.    Chief Complaint: Wound Care    Follow up related to right anterior leg wound.  No complaints.  All wounds have resolved.  Patient to continue taking terbinafine.  Plan of care updated.  Patient has clinic information in case of wounds reopening or future wounds.  Patient to follow up with Dermatology.      Review of Systems   All other systems reviewed and are negative.      Objective:     Vitals:    06/26/23 0910   BP: (!) 163/76   Pulse: 68   Temp: 96.3 °F (35.7 °C)         Physical Exam       Altered Skin Integrity 05/01/23 0900 Right anterior Leg #3 Ulceration Partial thickness tissue loss. Shallow open ulcer with a red or pink wound bed, without slough. Intact or Open/Ruptured Serum-filled blister. (Active)   05/01/23 0900   Altered Skin Integrity Present on Admission - Did Patient arrive to the hospital with altered skin?: yes   Side: Right   Orientation: anterior   Location: Leg   Wound Number: #3   Is this injury device related?:    Primary Wound Type: Ulceration   Description of Altered Skin Integrity: Partial thickness tissue loss. Shallow open ulcer with a red or pink wound bed, without slough. Intact or Open/Ruptured Serum-filled blister.   Ankle-Brachial Index:    Pulses:    Removal Indication and Assessment:    Wound Outcome:    (Retired) Wound Length (cm):    (Retired) Wound Width (cm):    (Retired) Depth (cm):    Wound Description (Comments):    Removal Indications:    Wound Image   06/26/23 0900   Dressing Appearance Open to air 06/26/23 0900   Drainage Amount None 06/26/23 0900   Tissue loss description Not applicable 06/26/23 0900   Periwound Area Intact;Dry;Petechiae 06/26/23 0900   Wound Edges Rolled/closed 06/26/23 0900   Wound Length (cm) 0 cm 06/26/23 0900   Wound Width (cm) 0 cm 06/26/23 0900   Wound Depth (cm) 0 cm 06/26/23 0900   Wound Volume (cm^3) 0 cm^3  06/26/23 0900   Wound Surface Area (cm^2) 0 cm^2 06/26/23 0900   Care Cleansed with:;Antimicrobial agent;Sterile normal saline 06/26/23 0900   Periwound Care Topical treatment applied 06/26/23 0900   Dressing Change Due 06/27/23 06/26/23 0900            Altered Skin Integrity Left medial Foot #2 Moisture associated dermatitis Intact skin with non-blanchable redness of localized area (Active)       Altered Skin Integrity Present on Admission - Did Patient arrive to the hospital with altered skin?:    Side: Left   Orientation: medial   Location: Foot   Wound Number: #2   Is this injury device related?:    Primary Wound Type: Moisture ass   Description of Altered Skin Integrity: Intact skin with non-blanchable redness of localized area   Ankle-Brachial Index:    Pulses:    Removal Indication and Assessment:    Wound Outcome:    (Retired) Wound Length (cm):    (Retired) Wound Width (cm):    (Retired) Depth (cm):    Wound Description (Comments):    Removal Indications:    Wound Image   06/26/23 0900   Dressing Appearance Open to air 06/26/23 0900   Drainage Amount None 06/26/23 0900   Appearance Closed/resurfaced 06/26/23 0900   Tissue loss description Not applicable 06/26/23 0900   Periwound Area Intact;Dry;Petechiae 06/26/23 0900   Wound Edges Rolled/closed 06/26/23 0900   Wound Length (cm) 0 cm 06/26/23 0900   Wound Width (cm) 0 cm 06/26/23 0900   Wound Depth (cm) 0 cm 06/26/23 0900   Wound Volume (cm^3) 0 cm^3 06/26/23 0900   Wound Surface Area (cm^2) 0 cm^2 06/26/23 0900   Care Cleansed with:;Antimicrobial agent;Sterile normal saline 06/26/23 0900   Periwound Care Topical treatment applied 06/26/23 0900   Dressing Change Due 06/27/23 06/26/23 0900            Altered Skin Integrity 05/29/23 1500 Left anterior;lower Knee #4 Moisture associated dermatitis (Active)   05/29/23 1500   Altered Skin Integrity Present on Admission - Did Patient arrive to the hospital with altered skin?:    Side: Left   Orientation:  anterior;lower   Location: Knee   Wound Number: #4   Is this injury device related?: No   Primary Wound Type: Moisture ass   Description of Altered Skin Integrity:    Ankle-Brachial Index:    Pulses:    Removal Indication and Assessment:    Wound Outcome:    (Retired) Wound Length (cm):    (Retired) Wound Width (cm):    (Retired) Depth (cm):    Wound Description (Comments):    Removal Indications:    Wound Image   06/26/23 0900   Dressing Appearance Open to air 06/26/23 0900   Drainage Amount None 06/26/23 0900   Appearance Closed/resurfaced 06/26/23 0900   Tissue loss description Not applicable 06/26/23 0900   Periwound Area Intact;Dry;Petechiae 06/26/23 0900   Wound Edges Rolled/closed 06/26/23 0900   Wound Length (cm) 0 cm 06/26/23 0900   Wound Width (cm) 0 cm 06/26/23 0900   Wound Depth (cm) 0 cm 06/26/23 0900   Wound Volume (cm^3) 0 cm^3 06/26/23 0900   Wound Surface Area (cm^2) 0 cm^2 06/26/23 0900   Care Cleansed with:;Antimicrobial agent;Sterile normal saline 06/26/23 0900   Periwound Care Topical treatment applied 06/26/23 0900   Dressing Change Due 06/27/23 06/26/23 0900            Altered Skin Integrity 05/29/23 1500 Left Back #5 Moisture associated dermatitis (Active)   05/29/23 1500   Altered Skin Integrity Present on Admission - Did Patient arrive to the hospital with altered skin?:    Side: Left   Orientation:    Location: Back   Wound Number: #5   Is this injury device related?:    Primary Wound Type: Moisture ass   Description of Altered Skin Integrity:    Ankle-Brachial Index:    Pulses:    Removal Indication and Assessment:    Wound Outcome:    (Retired) Wound Length (cm):    (Retired) Wound Width (cm):    (Retired) Depth (cm):    Wound Description (Comments):    Removal Indications:    Wound Image   06/26/23 0900   Dressing Appearance Open to air 06/26/23 0900   Drainage Amount None 06/26/23 0900   Appearance Closed/resurfaced 06/26/23 0900   Tissue loss description Not applicable 06/26/23 0900    Periwound Area Intact;Dry;Petechiae 06/26/23 0900   Wound Edges Rolled/closed 06/26/23 0900   Wound Length (cm) 0 cm 06/26/23 0900   Wound Width (cm) 0 cm 06/26/23 0900   Wound Depth (cm) 0 cm 06/26/23 0900   Wound Volume (cm^3) 0 cm^3 06/26/23 0900   Wound Surface Area (cm^2) 0 cm^2 06/26/23 0900   Care Cleansed with:;Antimicrobial agent;Sterile normal saline 06/26/23 0900   Periwound Care Topical treatment applied 06/26/23 0900   Dressing Change Due 06/27/23 06/26/23 0900            Altered Skin Integrity 05/29/23 1500 Left posterior Thigh #5 (Active)   05/29/23 1500   Altered Skin Integrity Present on Admission - Did Patient arrive to the hospital with altered skin?:    Side: Left   Orientation: posterior   Location: Thigh   Wound Number: #5   Is this injury device related?:    Primary Wound Type:    Description of Altered Skin Integrity:    Ankle-Brachial Index:    Pulses:    Removal Indication and Assessment:    Wound Outcome:    (Retired) Wound Length (cm):    (Retired) Wound Width (cm):    (Retired) Depth (cm):    Wound Description (Comments):    Removal Indications:    Dressing Appearance Open to air 06/26/23 0900   Drainage Amount None 06/26/23 0900   Appearance Closed/resurfaced 06/26/23 0900   Tissue loss description Not applicable 06/26/23 0900   Periwound Area Intact;Dry 06/26/23 0900   Wound Edges Rolled/closed 06/26/23 0900   Wound Length (cm) 0 cm 06/26/23 0900   Wound Width (cm) 0 cm 06/26/23 0900   Wound Depth (cm) 0 cm 06/26/23 0900   Wound Volume (cm^3) 0 cm^3 06/26/23 0900   Wound Surface Area (cm^2) 0 cm^2 06/26/23 0900   Care Cleansed with:;Antimicrobial agent;Sterile normal saline 06/26/23 0900   Periwound Care Topical treatment applied 06/26/23 0900   Dressing Change Due 06/27/23 06/26/23 0900           Assessment/Plan:         ICD-10-CM ICD-9-CM   1. Fungal dermatitis  B36.9 111.9   2. Venous stasis dermatitis of right lower extremity  I87.2 454.1           Tissue pathology and/or culture  taken:  [] Yes [x] No   Sharp debridement performed:   [] Yes [x] No   Labs ordered this visit:   [x] Yes [] No   Imaging ordered this visit:   [] Yes [x] No           Orders Placed This Encounter   Procedures    CBC auto differential     Standing Status:   Future     Standing Expiration Date:   8/24/2024    COMPREHENSIVE METABOLIC PANEL     Standing Status:   Future     Standing Expiration Date:   8/24/2024    Change dressing     R lateral ankle, L medial ankle, L knee, L posterior thigh, L posterior upper back     Cleanse with vashe and rinse with NS   Primary Dressing: Apply Lotrisone to all areas   All areas open to air   Frequency: daily   Follow up:  as needed  Other orders:  6/5/23 rx terbinafine x 90 days   Referral for dermatology ordered 5/29/23        Follow up if symptoms worsen or fail to improve, for Dr Simmons.

## 2023-07-10 ENCOUNTER — LAB VISIT (OUTPATIENT)
Dept: LAB | Facility: OTHER | Age: 53
End: 2023-07-10
Attending: PREVENTIVE MEDICINE
Payer: MEDICARE

## 2023-07-10 DIAGNOSIS — B36.9 FUNGAL DERMATITIS: ICD-10-CM

## 2023-07-10 LAB
ALBUMIN SERPL BCP-MCNC: 3.8 G/DL (ref 3.5–5.2)
ALP SERPL-CCNC: 78 U/L (ref 55–135)
ALT SERPL W/O P-5'-P-CCNC: 17 U/L (ref 10–44)
ANION GAP SERPL CALC-SCNC: 11 MMOL/L (ref 8–16)
AST SERPL-CCNC: 22 U/L (ref 10–40)
BASOPHILS # BLD AUTO: 0.03 K/UL (ref 0–0.2)
BASOPHILS NFR BLD: 0.6 % (ref 0–1.9)
BILIRUB SERPL-MCNC: 0.4 MG/DL (ref 0.1–1)
BUN SERPL-MCNC: 15 MG/DL (ref 6–20)
CALCIUM SERPL-MCNC: 9.5 MG/DL (ref 8.7–10.5)
CHLORIDE SERPL-SCNC: 106 MMOL/L (ref 95–110)
CO2 SERPL-SCNC: 25 MMOL/L (ref 23–29)
CREAT SERPL-MCNC: 0.9 MG/DL (ref 0.5–1.4)
DIFFERENTIAL METHOD: ABNORMAL
EOSINOPHIL # BLD AUTO: 0.1 K/UL (ref 0–0.5)
EOSINOPHIL NFR BLD: 1.9 % (ref 0–8)
ERYTHROCYTE [DISTWIDTH] IN BLOOD BY AUTOMATED COUNT: 13.2 % (ref 11.5–14.5)
EST. GFR  (NO RACE VARIABLE): >60 ML/MIN/1.73 M^2
GLUCOSE SERPL-MCNC: 119 MG/DL (ref 70–110)
HCT VFR BLD AUTO: 47.5 % (ref 37–48.5)
HGB BLD-MCNC: 15.4 G/DL (ref 12–16)
IMM GRANULOCYTES # BLD AUTO: 0.01 K/UL (ref 0–0.04)
IMM GRANULOCYTES NFR BLD AUTO: 0.2 % (ref 0–0.5)
LYMPHOCYTES # BLD AUTO: 1.4 K/UL (ref 1–4.8)
LYMPHOCYTES NFR BLD: 29.6 % (ref 18–48)
MCH RBC QN AUTO: 32.2 PG (ref 27–31)
MCHC RBC AUTO-ENTMCNC: 32.4 G/DL (ref 32–36)
MCV RBC AUTO: 99 FL (ref 82–98)
MONOCYTES # BLD AUTO: 0.3 K/UL (ref 0.3–1)
MONOCYTES NFR BLD: 5.4 % (ref 4–15)
NEUTROPHILS # BLD AUTO: 2.9 K/UL (ref 1.8–7.7)
NEUTROPHILS NFR BLD: 62.3 % (ref 38–73)
NRBC BLD-RTO: 0 /100 WBC
PLATELET # BLD AUTO: 131 K/UL (ref 150–450)
PMV BLD AUTO: 12 FL (ref 9.2–12.9)
POTASSIUM SERPL-SCNC: 3.7 MMOL/L (ref 3.5–5.1)
PROT SERPL-MCNC: 7.5 G/DL (ref 6–8.4)
RBC # BLD AUTO: 4.79 M/UL (ref 4–5.4)
SODIUM SERPL-SCNC: 142 MMOL/L (ref 136–145)
WBC # BLD AUTO: 4.63 K/UL (ref 3.9–12.7)

## 2023-07-10 PROCEDURE — 36415 COLL VENOUS BLD VENIPUNCTURE: CPT | Performed by: PREVENTIVE MEDICINE

## 2023-07-10 PROCEDURE — 80053 COMPREHEN METABOLIC PANEL: CPT | Performed by: PREVENTIVE MEDICINE

## 2023-07-10 PROCEDURE — 85025 COMPLETE CBC W/AUTO DIFF WBC: CPT | Performed by: PREVENTIVE MEDICINE
